# Patient Record
Sex: FEMALE | Race: WHITE | NOT HISPANIC OR LATINO | URBAN - METROPOLITAN AREA
[De-identification: names, ages, dates, MRNs, and addresses within clinical notes are randomized per-mention and may not be internally consistent; named-entity substitution may affect disease eponyms.]

---

## 2017-01-18 ENCOUNTER — OUTPATIENT (OUTPATIENT)
Dept: OUTPATIENT SERVICES | Facility: HOSPITAL | Age: 82
LOS: 1 days | Discharge: HOME | End: 2017-01-18

## 2017-01-18 ENCOUNTER — APPOINTMENT (OUTPATIENT)
Dept: HEMATOLOGY ONCOLOGY | Facility: CLINIC | Age: 82
End: 2017-01-18

## 2017-01-18 VITALS
SYSTOLIC BLOOD PRESSURE: 149 MMHG | TEMPERATURE: 99.2 F | HEIGHT: 60 IN | BODY MASS INDEX: 23.95 KG/M2 | HEART RATE: 96 BPM | DIASTOLIC BLOOD PRESSURE: 89 MMHG | RESPIRATION RATE: 14 BRPM | WEIGHT: 122 LBS

## 2017-06-27 DIAGNOSIS — C50.411 MALIGNANT NEOPLASM OF UPPER-OUTER QUADRANT OF RIGHT FEMALE BREAST: ICD-10-CM

## 2017-07-21 ENCOUNTER — OUTPATIENT (OUTPATIENT)
Dept: OUTPATIENT SERVICES | Facility: HOSPITAL | Age: 82
LOS: 1 days | Discharge: HOME | End: 2017-07-21

## 2017-07-21 DIAGNOSIS — R92.8 OTHER ABNORMAL AND INCONCLUSIVE FINDINGS ON DIAGNOSTIC IMAGING OF BREAST: ICD-10-CM

## 2017-07-26 ENCOUNTER — APPOINTMENT (OUTPATIENT)
Dept: HEMATOLOGY ONCOLOGY | Facility: CLINIC | Age: 82
End: 2017-07-26

## 2017-07-27 ENCOUNTER — APPOINTMENT (OUTPATIENT)
Dept: BREAST CENTER | Facility: CLINIC | Age: 82
End: 2017-07-27
Payer: COMMERCIAL

## 2017-07-27 PROCEDURE — 99212 OFFICE O/P EST SF 10 MIN: CPT

## 2017-08-18 ENCOUNTER — APPOINTMENT (OUTPATIENT)
Dept: HEMATOLOGY ONCOLOGY | Facility: CLINIC | Age: 82
End: 2017-08-18

## 2017-08-18 ENCOUNTER — OUTPATIENT (OUTPATIENT)
Dept: OUTPATIENT SERVICES | Facility: HOSPITAL | Age: 82
LOS: 1 days | Discharge: HOME | End: 2017-08-18

## 2017-08-18 VITALS
BODY MASS INDEX: 23.56 KG/M2 | HEIGHT: 60 IN | SYSTOLIC BLOOD PRESSURE: 139 MMHG | RESPIRATION RATE: 14 BRPM | TEMPERATURE: 97.2 F | HEART RATE: 76 BPM | WEIGHT: 120 LBS | DIASTOLIC BLOOD PRESSURE: 79 MMHG

## 2017-08-21 DIAGNOSIS — C50.411 MALIGNANT NEOPLASM OF UPPER-OUTER QUADRANT OF RIGHT FEMALE BREAST: ICD-10-CM

## 2018-01-22 ENCOUNTER — OUTPATIENT (OUTPATIENT)
Dept: OUTPATIENT SERVICES | Facility: HOSPITAL | Age: 83
LOS: 1 days | Discharge: HOME | End: 2018-01-22

## 2018-01-22 DIAGNOSIS — R92.8 OTHER ABNORMAL AND INCONCLUSIVE FINDINGS ON DIAGNOSTIC IMAGING OF BREAST: ICD-10-CM

## 2018-02-01 ENCOUNTER — APPOINTMENT (OUTPATIENT)
Dept: BREAST CENTER | Facility: CLINIC | Age: 83
End: 2018-02-01
Payer: COMMERCIAL

## 2018-02-01 VITALS
DIASTOLIC BLOOD PRESSURE: 80 MMHG | SYSTOLIC BLOOD PRESSURE: 134 MMHG | OXYGEN SATURATION: 96 % | HEART RATE: 78 BPM | BODY MASS INDEX: 23.16 KG/M2 | WEIGHT: 118 LBS | HEIGHT: 60 IN

## 2018-02-01 PROCEDURE — 99213 OFFICE O/P EST LOW 20 MIN: CPT

## 2018-02-23 ENCOUNTER — OUTPATIENT (OUTPATIENT)
Dept: OUTPATIENT SERVICES | Facility: HOSPITAL | Age: 83
LOS: 1 days | Discharge: HOME | End: 2018-02-23

## 2018-02-23 ENCOUNTER — APPOINTMENT (OUTPATIENT)
Dept: HEMATOLOGY ONCOLOGY | Facility: CLINIC | Age: 83
End: 2018-02-23

## 2018-02-23 VITALS
HEIGHT: 60 IN | HEART RATE: 70 BPM | BODY MASS INDEX: 23.95 KG/M2 | TEMPERATURE: 96.6 F | DIASTOLIC BLOOD PRESSURE: 79 MMHG | WEIGHT: 122 LBS | RESPIRATION RATE: 16 BRPM | SYSTOLIC BLOOD PRESSURE: 146 MMHG

## 2018-02-26 DIAGNOSIS — C50.411 MALIGNANT NEOPLASM OF UPPER-OUTER QUADRANT OF RIGHT FEMALE BREAST: ICD-10-CM

## 2018-07-23 ENCOUNTER — APPOINTMENT (OUTPATIENT)
Dept: HEMATOLOGY ONCOLOGY | Facility: CLINIC | Age: 83
End: 2018-07-23

## 2018-07-23 ENCOUNTER — OUTPATIENT (OUTPATIENT)
Dept: OUTPATIENT SERVICES | Facility: HOSPITAL | Age: 83
LOS: 1 days | Discharge: HOME | End: 2018-07-23

## 2018-07-23 VITALS
SYSTOLIC BLOOD PRESSURE: 180 MMHG | HEIGHT: 60 IN | WEIGHT: 120 LBS | RESPIRATION RATE: 16 BRPM | HEART RATE: 65 BPM | BODY MASS INDEX: 23.56 KG/M2 | DIASTOLIC BLOOD PRESSURE: 80 MMHG

## 2018-07-24 DIAGNOSIS — C50.411 MALIGNANT NEOPLASM OF UPPER-OUTER QUADRANT OF RIGHT FEMALE BREAST: ICD-10-CM

## 2018-08-14 ENCOUNTER — APPOINTMENT (OUTPATIENT)
Dept: BREAST CENTER | Facility: CLINIC | Age: 83
End: 2018-08-14
Payer: COMMERCIAL

## 2018-08-14 VITALS
DIASTOLIC BLOOD PRESSURE: 76 MMHG | HEART RATE: 82 BPM | OXYGEN SATURATION: 96 % | BODY MASS INDEX: 23.56 KG/M2 | SYSTOLIC BLOOD PRESSURE: 132 MMHG | WEIGHT: 120 LBS | HEIGHT: 60 IN

## 2018-08-14 PROCEDURE — 99213 OFFICE O/P EST LOW 20 MIN: CPT

## 2019-01-22 NOTE — PAST MEDICAL HISTORY
[Postmenopausal] : The patient is postmenopausal [Menarche Age ____] : age at menarche was [unfilled] [Total Preg ___] : G[unfilled] [Live Births ___] : P[unfilled]  [Age At Live Birth ___] : Age at live birth: [unfilled]

## 2019-01-24 ENCOUNTER — FORM ENCOUNTER (OUTPATIENT)
Age: 84
End: 2019-01-24

## 2019-01-25 ENCOUNTER — OUTPATIENT (OUTPATIENT)
Dept: OUTPATIENT SERVICES | Facility: HOSPITAL | Age: 84
LOS: 1 days | Discharge: HOME | End: 2019-01-25

## 2019-01-25 DIAGNOSIS — Z85.3 PERSONAL HISTORY OF MALIGNANT NEOPLASM OF BREAST: ICD-10-CM

## 2019-01-29 ENCOUNTER — APPOINTMENT (OUTPATIENT)
Dept: BREAST CENTER | Facility: CLINIC | Age: 84
End: 2019-01-29
Payer: COMMERCIAL

## 2019-01-29 VITALS
HEIGHT: 60 IN | SYSTOLIC BLOOD PRESSURE: 138 MMHG | BODY MASS INDEX: 23.16 KG/M2 | HEART RATE: 62 BPM | WEIGHT: 118 LBS | DIASTOLIC BLOOD PRESSURE: 78 MMHG

## 2019-01-29 PROCEDURE — 99212 OFFICE O/P EST SF 10 MIN: CPT

## 2019-01-29 NOTE — HISTORY OF PRESENT ILLNESS
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  Foci of DCIS intermed nuclear grade, cribiform and micropapillary type.\par No RT - Dr. Nagel; No Tamoxifen, No med onc, deferred by pt.  FHx breast cancer - sister at 74.  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  ER/NM (+), HEr2 (-).\par s/p Right NLOC 02/12/16 - 1.25 mm mod diff invasive solid papillary carcinoma, non ext DCIS, low grade; (-) margins.  \par No LVI or perineural invasion.  Papilloma, ALH. \par s/p GUME Insertion 3/10/16; completed PBI on 3/18/16.  on AI by Dr. Hong. - *unable to tolerate endocrine tx.

## 2019-01-29 NOTE — DATA REVIEWED
[FreeTextEntry1] : B/L Dx Mammo - 01/25/19:\par Breast composition:There are scattered areas of fibroglandular density. \par \par The patient is status post a right lumpectomy with stable architectural \par distortion most consistent with postsurgical change. No suspicious masses \par or abnormal clusters of microcalcifications are seen within either breast. \par \par Impression: Status post a right lumpectomy with stable architectural \par distortion most consistent with postsurgical change. \par \par No evidence of malignancy. \par \par Recommendation: Unless otherwise indicated by clinical findings, annual \par screening mammography recommended. \par \par BI-RADS Category 2: Benign

## 2019-01-29 NOTE — ASSESSMENT
[FreeTextEntry1] : Adenike is s/p left breast lumpectomy x2 for breast cancer (2011 and 2017).  She has a benign CBE. There are no palpable findings, nipple discharge or inversion. I reviewed her recent imaging which appears benign.\par \par She will f/up in 6 months for a CBE.  \par \par I spent a total of 10 minutes of face to face time with this patient, greater than 50% of which was spent in counseling and/or coordination of care.  All of her questions were appropriately answered.\par

## 2019-01-29 NOTE — REVIEW OF SYSTEMS
[Negative] : Constitutional [Breast Pain] : no breast pain [Breast Lump] : no breast lump [Nipple Discharge] : no nipple discharge [Nipple Inverted] : no inversion of the nipple

## 2019-03-12 ENCOUNTER — EMERGENCY (EMERGENCY)
Facility: HOSPITAL | Age: 84
LOS: 0 days | Discharge: HOME | End: 2019-03-12
Attending: EMERGENCY MEDICINE | Admitting: EMERGENCY MEDICINE

## 2019-03-12 VITALS
OXYGEN SATURATION: 97 % | DIASTOLIC BLOOD PRESSURE: 54 MMHG | RESPIRATION RATE: 18 BRPM | TEMPERATURE: 98 F | SYSTOLIC BLOOD PRESSURE: 114 MMHG | HEART RATE: 80 BPM

## 2019-03-12 VITALS
TEMPERATURE: 97 F | RESPIRATION RATE: 17 BRPM | OXYGEN SATURATION: 96 % | HEART RATE: 73 BPM | DIASTOLIC BLOOD PRESSURE: 63 MMHG | SYSTOLIC BLOOD PRESSURE: 136 MMHG

## 2019-03-12 DIAGNOSIS — S82.209A UNSPECIFIED FRACTURE OF SHAFT OF UNSPECIFIED TIBIA, INITIAL ENCOUNTER FOR CLOSED FRACTURE: ICD-10-CM

## 2019-03-12 DIAGNOSIS — E78.5 HYPERLIPIDEMIA, UNSPECIFIED: ICD-10-CM

## 2019-03-12 DIAGNOSIS — R91.8 OTHER NONSPECIFIC ABNORMAL FINDING OF LUNG FIELD: ICD-10-CM
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
## 2019-03-12 DIAGNOSIS — S82.409A UNSPECIFIED FRACTURE OF SHAFT OF UNSPECIFIED FIBULA, INITIAL ENCOUNTER FOR CLOSED FRACTURE: ICD-10-CM

## 2019-03-12 DIAGNOSIS — R26.2 DIFFICULTY IN WALKING, NOT ELSEWHERE CLASSIFIED: ICD-10-CM
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
## 2019-03-12 DIAGNOSIS — I10 ESSENTIAL (PRIMARY) HYPERTENSION: ICD-10-CM

## 2019-03-12 LAB
ALBUMIN SERPL ELPH-MCNC: 3.9 G/DL — SIGNIFICANT CHANGE UP (ref 3.5–5.2)
ALP SERPL-CCNC: 43 U/L — SIGNIFICANT CHANGE UP (ref 30–115)
ALT FLD-CCNC: 14 U/L — SIGNIFICANT CHANGE UP (ref 0–41)
ANION GAP SERPL CALC-SCNC: 10 MMOL/L — SIGNIFICANT CHANGE UP (ref 7–14)
AST SERPL-CCNC: 24 U/L — SIGNIFICANT CHANGE UP (ref 0–41)
BASOPHILS # BLD AUTO: 0 K/UL — SIGNIFICANT CHANGE UP (ref 0–0.2)
BASOPHILS NFR BLD AUTO: 0 % — SIGNIFICANT CHANGE UP (ref 0–1)
BILIRUB SERPL-MCNC: 1 MG/DL — SIGNIFICANT CHANGE UP (ref 0.2–1.2)
BUN SERPL-MCNC: 16 MG/DL — SIGNIFICANT CHANGE UP (ref 10–20)
CALCIUM SERPL-MCNC: 8.3 MG/DL — LOW (ref 8.5–10.1)
CHLORIDE SERPL-SCNC: 97 MMOL/L — LOW (ref 98–110)
CO2 SERPL-SCNC: 27 MMOL/L — SIGNIFICANT CHANGE UP (ref 17–32)
CREAT SERPL-MCNC: 0.6 MG/DL — LOW (ref 0.7–1.5)
EOSINOPHIL # BLD AUTO: 0 K/UL — SIGNIFICANT CHANGE UP (ref 0–0.7)
EOSINOPHIL NFR BLD AUTO: 0 % — SIGNIFICANT CHANGE UP (ref 0–8)
GLUCOSE SERPL-MCNC: 118 MG/DL — HIGH (ref 70–99)
HCT VFR BLD CALC: 41.9 % — SIGNIFICANT CHANGE UP (ref 37–47)
HGB BLD-MCNC: 14.1 G/DL — SIGNIFICANT CHANGE UP (ref 12–16)
IMM GRANULOCYTES NFR BLD AUTO: 0.1 % — SIGNIFICANT CHANGE UP (ref 0.1–0.3)
LACTATE SERPL-SCNC: 1.6 MMOL/L — SIGNIFICANT CHANGE UP (ref 0.5–2.2)
LYMPHOCYTES # BLD AUTO: 0.58 K/UL — LOW (ref 1.2–3.4)
LYMPHOCYTES # BLD AUTO: 7.5 % — LOW (ref 20.5–51.1)
MCHC RBC-ENTMCNC: 30.1 PG — SIGNIFICANT CHANGE UP (ref 27–31)
MCHC RBC-ENTMCNC: 33.7 G/DL — SIGNIFICANT CHANGE UP (ref 32–37)
MCV RBC AUTO: 89.3 FL — SIGNIFICANT CHANGE UP (ref 81–99)
MONOCYTES # BLD AUTO: 0.38 K/UL — SIGNIFICANT CHANGE UP (ref 0.1–0.6)
MONOCYTES NFR BLD AUTO: 4.9 % — SIGNIFICANT CHANGE UP (ref 1.7–9.3)
NEUTROPHILS # BLD AUTO: 6.75 K/UL — HIGH (ref 1.4–6.5)
NEUTROPHILS NFR BLD AUTO: 87.5 % — HIGH (ref 42.2–75.2)
NRBC # BLD: 0 /100 WBCS — SIGNIFICANT CHANGE UP (ref 0–0)
PLATELET # BLD AUTO: 163 K/UL — SIGNIFICANT CHANGE UP (ref 130–400)
POTASSIUM SERPL-MCNC: 3.2 MMOL/L — LOW (ref 3.5–5)
POTASSIUM SERPL-SCNC: 3.2 MMOL/L — LOW (ref 3.5–5)
PROT SERPL-MCNC: 6.3 G/DL — SIGNIFICANT CHANGE UP (ref 6–8)
RBC # BLD: 4.69 M/UL — SIGNIFICANT CHANGE UP (ref 4.2–5.4)
RBC # FLD: 13 % — SIGNIFICANT CHANGE UP (ref 11.5–14.5)
SODIUM SERPL-SCNC: 134 MMOL/L — LOW (ref 135–146)
WBC # BLD: 7.72 K/UL — SIGNIFICANT CHANGE UP (ref 4.8–10.8)
WBC # FLD AUTO: 7.72 K/UL — SIGNIFICANT CHANGE UP (ref 4.8–10.8)

## 2019-03-12 RX ORDER — MORPHINE SULFATE 50 MG/1
4 CAPSULE, EXTENDED RELEASE ORAL ONCE
Qty: 0 | Refills: 0 | Status: DISCONTINUED | OUTPATIENT
Start: 2019-03-12 | End: 2019-03-12

## 2019-03-12 RX ORDER — SODIUM CHLORIDE 9 MG/ML
1000 INJECTION INTRAMUSCULAR; INTRAVENOUS; SUBCUTANEOUS ONCE
Qty: 0 | Refills: 0 | Status: COMPLETED | OUTPATIENT
Start: 2019-03-12 | End: 2019-03-12

## 2019-03-12 RX ADMIN — MORPHINE SULFATE 4 MILLIGRAM(S): 50 CAPSULE, EXTENDED RELEASE ORAL at 12:52

## 2019-03-12 RX ADMIN — MORPHINE SULFATE 4 MILLIGRAM(S): 50 CAPSULE, EXTENDED RELEASE ORAL at 13:50

## 2019-03-12 RX ADMIN — SODIUM CHLORIDE 2000 MILLILITER(S): 9 INJECTION INTRAMUSCULAR; INTRAVENOUS; SUBCUTANEOUS at 13:50

## 2019-03-12 NOTE — ED PROVIDER NOTE - OBJECTIVE STATEMENT
83 yo F now with acute gastrointestinal upset, vomiting and diarrhea, went to bathroom yesterday and on the way, injured her ankle, unable to ambulate, has a sharp pain to distal R leg, worse with movt, since yesterday.  no vomiting today. no head inj.

## 2019-03-12 NOTE — ED ADULT NURSE NOTE - NS PRO PASSIVE SMOKE EXP
H&P Update:  Joan Tse was seen and examined. History and physical has been reviewed. The patient has been examined.  There have been no significant clinical changes since the completion of the originally dated History and Physical.    Signed By: Sherri Platt MD     September 11, 2017 9:16 AM Unknown

## 2019-03-12 NOTE — ED PROVIDER NOTE - CARE PROVIDER_API CALL
Joey Gonzalez)  Orthopaedic Surgery  [address] [place] [address]  Phone: [phone]  Fax: [phone]  Follow Up Time: 4-6 Days

## 2019-03-12 NOTE — ED PROVIDER NOTE - NS ED ROS FT
Review of Systems    Constitutional: (-) fever  Cardiovascular: (-) chest pain, (-) syncope  Respiratory: (-) cough, (-) shortness of breath  Gastrointestinal: (+) vomiting, (+) diarrhea, (-) abdominal pain  Musculoskeletal: (-) neck pain, (-) back pain, (-) joint pain  Integumentary: (-) rash, (-) edema  Neurological: (-) headache, (-) altered mental status    Except as documented in the HPI, all other systems are negative.

## 2019-03-12 NOTE — ED ADULT NURSE NOTE - OBJECTIVE STATEMENT
Pt with hx of HTN, high cholesterol, GERD and osteoporosis presents s/p fall yesterday. Pt has been having flu like symptoms and last night while in the bathroom pt tripped and fell injuring right ankle. No head trauma, no LOC, no blood thinners. Pt c/o pain and swelling to right ankle/shin with some redness and an abrasion noted.

## 2019-03-12 NOTE — ED PROVIDER NOTE - PHYSICAL EXAMINATION
VITAL SIGNS: I have reviewed nursing notes and confirm.  CONSTITUTIONAL: non-toxic, well appearing, elderly female, + airway intact, GCS 15  SKIN: + abrasion to distal R leg, with swelling, no crepitus  EYES: PERRL, EOMI,  ENT: MMM, tongue midline  NECK: Supple; no cervical-thoracic-lumbar spine tenderness  CARD: RRR, equal radial pulses bilaterally 2+  RESP: CTAB, no respiratory distress, no crepitus over chest wall  ABD: Soft, non-tender, non-distended  PELVIS: stable  EXT: Normal ROM x3, decreased ROM to R ankel. + bony tenderness distal R leg, laterally, + DP bilat. 2+, equal strength bilaterally  NEURO: Alert, oriented. CN2-12 intact, equal strength bilaterally, nl gait.  PSYCH: Cooperative, appropriate.

## 2019-03-12 NOTE — ED ADULT TRIAGE NOTE - CHIEF COMPLAINT QUOTE
Pt s/p fall yesterday, pt reports she has the flu and was weak, c/o right ankle pain, pt reports she has the flu

## 2019-03-12 NOTE — ED PROVIDER NOTE - CARE PLAN
Principal Discharge DX:	Tibial fracture  Secondary Diagnosis:	Fibula fracture  Secondary Diagnosis:	Opacity of lung on imaging study

## 2019-03-12 NOTE — ED ADULT NURSE NOTE - NSIMPLEMENTINTERV_GEN_ALL_ED
Implemented All Fall with Harm Risk Interventions:  [place] to call system. Call bell, personal items and telephone within reach. Instruct patient to call for assistance. Room bathroom lighting operational. Non-slip footwear when patient is off stretcher. Physically safe environment: no spills, clutter or unnecessary equipment. Stretcher in lowest position, wheels locked, appropriate side rails in place. Provide visual cue, wrist band, yellow gown, etc. Monitor gait and stability. Monitor for mental status changes and reorient to person, place, and time. Review medications for side effects contributing to fall risk. Reinforce activity limits and safety measures with patient and family. Provide visual clues: red socks.

## 2019-03-12 NOTE — ED PROVIDER NOTE - PROGRESS NOTE DETAILS
ortho consulted seen by ortho. splint applied by ortho. stable for dc. repeat xray after splint. additionally, recommending abx for possible cellulititis. no leukocytosis.  no cough. cxr with bilat opaciites. family aware. will initiate doxy to cover for both.  f/u ortho outpt. son will take pt home. walker to be obtained. f/u dr ng. repeat xrays done. hypokalemia - instructed regarding dietary intake, eg. bananas.

## 2019-03-12 NOTE — ED PROVIDER NOTE - CLINICAL SUMMARY MEDICAL DECISION MAKING FREE TEXT BOX
tib/fib, acute, seen by ortho. splint applied by ortho. stable for dc. repeat xray after splint. additionally, recommending abx for possible cellulititis. no leukocytosis.  no cough. cxr with bilat opaciites. family aware. will initiate doxy to cover for both.  f/u ortho outpt.

## 2019-07-22 ENCOUNTER — OUTPATIENT (OUTPATIENT)
Dept: OUTPATIENT SERVICES | Facility: HOSPITAL | Age: 84
LOS: 1 days | Discharge: HOME | End: 2019-07-22

## 2019-07-22 ENCOUNTER — APPOINTMENT (OUTPATIENT)
Dept: HEMATOLOGY ONCOLOGY | Facility: CLINIC | Age: 84
End: 2019-07-22
Payer: COMMERCIAL

## 2019-07-22 VITALS
HEIGHT: 57.5 IN | HEART RATE: 75 BPM | WEIGHT: 117 LBS | DIASTOLIC BLOOD PRESSURE: 80 MMHG | TEMPERATURE: 96.8 F | BODY MASS INDEX: 24.9 KG/M2 | SYSTOLIC BLOOD PRESSURE: 166 MMHG | RESPIRATION RATE: 16 BRPM

## 2019-07-22 PROBLEM — E78.5 HYPERLIPIDEMIA, UNSPECIFIED: Chronic | Status: ACTIVE | Noted: 2019-03-12

## 2019-07-22 PROBLEM — M81.0 AGE-RELATED OSTEOPOROSIS WITHOUT CURRENT PATHOLOGICAL FRACTURE: Chronic | Status: ACTIVE | Noted: 2019-03-12

## 2019-07-22 PROBLEM — I10 ESSENTIAL (PRIMARY) HYPERTENSION: Chronic | Status: ACTIVE | Noted: 2019-03-12

## 2019-07-22 PROBLEM — K21.9 GASTRO-ESOPHAGEAL REFLUX DISEASE WITHOUT ESOPHAGITIS: Chronic | Status: ACTIVE | Noted: 2019-03-12

## 2019-07-22 PROCEDURE — 99213 OFFICE O/P EST LOW 20 MIN: CPT

## 2019-07-24 NOTE — CONSULT LETTER
[Courtesy Letter:] : I had the pleasure of seeing your patient, [unfilled], in my office today. [Dear  ___] : Dear  [unfilled], [Sincerely,] : Sincerely, [Please see my note below.] : Please see my note below. [FreeTextEntry3] : Filipe Hong MD

## 2019-07-24 NOTE — PHYSICAL EXAM
[Restricted in physically strenuous activity but ambulatory and able to carry out work of a light or sedentary nature] : Status 1- Restricted in physically strenuous activity but ambulatory and able to carry out work of a light or sedentary nature, e.g., light house work, office work [Normal] : affect appropriate [de-identified] : The right breast is s/p lumpectomy.  There is no palpable abnormality.  The left breast and left axilla are normal. [de-identified] : Status post right ankle fracture.

## 2019-07-24 NOTE — HISTORY OF PRESENT ILLNESS
[de-identified] :  83-year-old female is here today for a followup visit.  She has a history of a right breast solid papillary carcinoma, status post lumpectomy in 01/2012.  She subsequently developed recurrent solid papillary carcinoma, ER/[place] positive and DCIS,  and had right breast lumpectomy on 02/12/2016.  She received partial breast radiotherapy with GUME catheter.  She took adjuvant endocrine therapy with anastrozole for a few months and developed a lot of body aching. She was given an option to try Exemestane but she could not.  She subsequently stopped medication. She was on Duloxetine for Fibromyalgia and stopped subsequently because her symptom resolved. \par \par On 1/2018, she had b/l diagnostic mammo and there was no suspicious finding.  On 7/21/17, she had right breast dx mammo which showed stable post surgical change.\par \par The patient also has history of osteopenia, and she has been taking Caltrate 600 plus D and Fosamax.  \par \par She follows with Dr. Isaac Lo at [facility] for osteoporosis. She stopped Fosamax and switched to Prolia. \par \par Today, she reports feeling well. She does not have nay breast related symptoms. \par  [de-identified] : She can not tolerate AI due to muscular skeletal side effects. She sees an endocrinologist for osteopenia and osteoporosis. She is getting Prolia injection every 6 months.\par Today, she reports feeling well. Sometime, she has pain in her right breast, lumpectomy site, which resolves on its own. \par \par 7/22/19:\par The patient is here for f/u visit. She feels well and has no breast related symptoms. In 1/2019, she had b/l mammo. There was no suspicious finding. She can not tolerate AI due to muscular skeletal side effects. She sees an endocrinologist for osteopenia and osteoporosis. She is getting Prolia injection every 6 months. She tripled down and had right ankle fracture a few months ago.

## 2019-07-24 NOTE — ASSESSMENT
[FreeTextEntry1] : 1. Recurrent right breast solid papillary carcinoma, ER/ID positive and ductal carcinoma in situ, status post lumpectomy, partial breast radiotherapy, unable to tolerate endocrine therapy.  There is no evidence of recurrent disease.\par 2. Osteopenia/Osteoporosis.\par \par RECOMMENDATION:  \par 1. Continue observation and annual screening mammogram.\par 2. She will continue Prolia, calcium and vitamin D supplement for osteopenia/osteoporosis.\par 3. She will come back for followup visit in 12 months.\par 4. followup with PCP for healthy maintenance.\par

## 2019-07-31 DIAGNOSIS — C50.411 MALIGNANT NEOPLASM OF UPPER-OUTER QUADRANT OF RIGHT FEMALE BREAST: ICD-10-CM

## 2019-07-31 DIAGNOSIS — M81.0 AGE-RELATED OSTEOPOROSIS WITHOUT CURRENT PATHOLOGICAL FRACTURE: ICD-10-CM

## 2019-08-15 ENCOUNTER — APPOINTMENT (OUTPATIENT)
Dept: BREAST CENTER | Facility: CLINIC | Age: 84
End: 2019-08-15
Payer: COMMERCIAL

## 2019-08-15 VITALS
SYSTOLIC BLOOD PRESSURE: 122 MMHG | TEMPERATURE: 97.8 F | BODY MASS INDEX: 25.24 KG/M2 | WEIGHT: 117 LBS | HEIGHT: 57 IN | DIASTOLIC BLOOD PRESSURE: 80 MMHG

## 2019-08-15 PROCEDURE — 99213 OFFICE O/P EST LOW 20 MIN: CPT

## 2019-08-15 NOTE — HISTORY OF PRESENT ILLNESS
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  Foci of DCIS intermed nuclear grade, cribiform and micropapillary type.\par No RT - Dr. Nagel; No Tamoxifen, No med onc, deferred by pt.  FHx breast cancer - sister at 74.  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  ER/FL (+), HEr2 (-).\par s/p Right NLOC 02/12/16 - 1.25 mm mod diff invasive solid papillary carcinoma, non ext DCIS, low grade; (-) margins.  \par No LVI or perineural invasion.  Papilloma, ALH. \par s/p GUME Insertion 3/10/16; completed PBI on 3/18/16.  on AI by Dr. Hong. - *unable to tolerate endocrine tx.

## 2020-01-19 ENCOUNTER — FORM ENCOUNTER (OUTPATIENT)
Age: 85
End: 2020-01-19

## 2020-01-20 ENCOUNTER — OUTPATIENT (OUTPATIENT)
Dept: OUTPATIENT SERVICES | Facility: HOSPITAL | Age: 85
LOS: 1 days | Discharge: HOME | End: 2020-01-20
Payer: MEDICARE

## 2020-01-20 DIAGNOSIS — Z85.3 PERSONAL HISTORY OF MALIGNANT NEOPLASM OF BREAST: ICD-10-CM

## 2020-01-20 PROCEDURE — G0279: CPT | Mod: 26

## 2020-01-20 PROCEDURE — 77066 DX MAMMO INCL CAD BI: CPT | Mod: 26

## 2020-02-18 ENCOUNTER — APPOINTMENT (OUTPATIENT)
Dept: BREAST CENTER | Facility: CLINIC | Age: 85
End: 2020-02-18
Payer: MEDICARE

## 2020-02-18 VITALS
HEIGHT: 57 IN | SYSTOLIC BLOOD PRESSURE: 130 MMHG | TEMPERATURE: 98.6 F | DIASTOLIC BLOOD PRESSURE: 76 MMHG | WEIGHT: 117 LBS | BODY MASS INDEX: 25.24 KG/M2

## 2020-02-18 PROCEDURE — 99212 OFFICE O/P EST SF 10 MIN: CPT

## 2020-02-18 NOTE — DATA REVIEWED
[FreeTextEntry1] : EXAM: MG MAMMO DIAG W GARRISON BI# \par PROCEDURE DATE: 01/20/2020 \par INTERPRETATION: HISTORY: \par Bilateral MG MAMMO DIAG W GARRISON BI# was performed. Patient is 85 years old and is seen for diagnostic evaluation. \par The patient has a history of right lumpectomy at age 77. The patient has no family history of breast cancer. \par RISK ASSESSMENT: \par Tyrer-Cuzick Lifetime Risk: 0.1 \par CLINICAL BREAST EXAM: \par The patient reports her last clinical breast exam was performed 5 months ago. \par COMPARISON STUDIES: \par The present examination has been compared to prior imaging studies performed at [facility] on \par 01/04/2017, 07/21/2017, 01/22/2018 and 01/25/2019. \par MAMMOGRAM FINDINGS: \par Mammography was performed including the following views: bilateral craniocaudal with tomosynthesis and bilateral \par mediolateral oblique with tomosynthesis. The examination includes digital synthetic 2D and digital tomosynthesis 3D \par images. Additional imaging analysis was performed using CAD (computer-aided detection) software. \par There are scattered areas of fibroglandular density. \par There is an area of architectural distortion at the site of lumpectomy seen in the right breast. \par No suspicious mass, grouping of calcifications, or other abnormality is identified.\par IMPRESSION: \par There is no mammographic evidence of malignancy. \par RECOMMENDATION: \par Unless otherwise indicated by clinical findings, the patient should resume annual screening in 1 year.\par ASSESSMENT: \par BI-RADS Category 2: Benign \par The patient will be notified of these results by telephone, and will also be mailed a written summary in layman's terms.\par JARROD JUAN M.D., ATTENDING RADIOLOGIST \par This document has been electronically signed. Jan 20 2020 9:29AM

## 2020-02-18 NOTE — ASSESSMENT
[FreeTextEntry1] : 85 year old female who presents today for her clinical breast examination.  She has a history of right breast solid papillary carcinoma on core biopsy in 2011 status post lumpectomy at 12:00 location demonstrating papillary carcinoma in situ.  She did not receive radiation therapy at that time and she deferred medical oncology.  She also has a history of right breast estrogen receptor positive solid papillary carcinoma on core biopsy 1/16/16 status post lumpectomy at the 8:00 position demonstrating moderately differentiated invasive solid papillary carcinoma and non extensive DCIS.  She received radiation via the GUME catheter for five days. She was an an AI briefly but discontinued due to side effects.  Her family history is significant for her sister with breast cancer at 74.  \par \par Bilateral diagnostic mammogram was performed in January.  This demonstrated scattered areas of fibroglandular densities.  There is an area of architectural distortion at the lumpectomy site seen in the right breast.  There are no suspicious masses, groupings of calcifications or other abnormalities identified.\par \par She is here for evaluation of these findings.  At this time, these findings do not present the need for surgical intervention.  She has a benign clinical breast examination and no current complaints related to the breasts. For now, she will need her annual screening mammograms scheduled for one year.  She will return in six months for another post operative examination.  I spent a total of 10 minutes of face to face time with this patient, greater than 50% of which was spent in counseling and/or coordination of care.  All of her questions were appropriately answered.

## 2020-02-18 NOTE — REVIEW OF SYSTEMS
[Fever] : no fever [Chills] : no chills [Breast Lump] : no breast lump [Breast Pain] : no breast pain [Breast Reddening] : no reddening of the breast [Breast Swelling] : no breast swelling [Breast Warmth] : no breast warmth [Enlargement] : no breast enlargement [Breast Itching] : no breast itching [Decreasing In Size] : breast size not decreasing [Dimpling Of Skin] : no dimpling of breast skin ['Orange Peel' Appearance] : no 'orange peel' appearance of breast skin [Nipple Inverted] : no inversion of the nipple [Nipple Discharge] : no nipple discharge

## 2020-02-18 NOTE — PHYSICAL EXAM
[Normocephalic] : normocephalic [Atraumatic] : atraumatic [Supple] : supple [No Supraclavicular Adenopathy] : no supraclavicular adenopathy [No Cervical Adenopathy] : no cervical adenopathy [Examined in the supine and seated position] : examined in the supine and seated position [No Thyromegaly] : no thyromegaly [Symmetrical] : symmetrical [No dominant masses] : no dominant masses in right breast  [No dominant masses] : no dominant masses left breast [No Nipple Retraction] : no left nipple retraction [No Nipple Discharge] : no right nipple discharge [Breast Mass Right Breast ___cm] : no masses [Breast Mass Left Breast ___cm] : no masses [No Axillary Lymphadenopathy] : no right axillary lymphadenopathy [No Edema] : no edema [No Swelling] : no swelling [Full ROM] : full range of motion [No Rashes] : no rashes [No Ulceration] : no ulceration [Breast Nipple Inversion] : nipples not inverted [Breast Nipple Retraction] : nipples not retracted [Breast Nipple Flattening] : nipples not flattened [Breast Nipple Fissures] : nipples not fissured [Breast Abnormal Lactation (Galactorrhea)] : no galactorrhea [Breast Abnormal Secretion Bloody Fluid] : no bloody discharge [Breast Abnormal Secretion Serous Fluid] : no serous discharge [Breast Abnormal Secretion Opalescent Fluid] : no milky discharge

## 2020-02-18 NOTE — HISTORY OF PRESENT ILLNESS
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  Foci of DCIS intermediate nuclear grade, cribiform and micropapillary type.\par No RT - Dr. Nagel; No Tamoxifen, No med oncology, deferred by patient.  \par \par Her family history is significant for her sister with breast cancer at 74.  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  \par Estrogen receptor positive\par Progesterone receptor positive\par HER2 negative\par \par Status post Right NLOC 02/12/16 demonstrating 1.25 mm mod differentiated invasive solid papillary carcinoma, non extensive DCIS, low grade; with negative margins.  No LVI or perineural invasion, papilloma, ALH. \par \par Status post GUME Insertion 3/10/16; completed PBI on 3/18/16.  \par Was on AI by Dr. Hong; discontinued due to side effects.

## 2020-02-24 ENCOUNTER — APPOINTMENT (OUTPATIENT)
Dept: RADIATION ONCOLOGY | Facility: HOSPITAL | Age: 85
End: 2020-02-24
Payer: MEDICARE

## 2020-02-24 VITALS
HEART RATE: 72 BPM | WEIGHT: 119 LBS | RESPIRATION RATE: 16 BRPM | BODY MASS INDEX: 25.75 KG/M2 | TEMPERATURE: 96.6 F | SYSTOLIC BLOOD PRESSURE: 160 MMHG | DIASTOLIC BLOOD PRESSURE: 72 MMHG

## 2020-02-24 DIAGNOSIS — C80.1 MALIGNANT (PRIMARY) NEOPLASM, UNSPECIFIED: ICD-10-CM

## 2020-02-24 PROCEDURE — 99213 OFFICE O/P EST LOW 20 MIN: CPT

## 2020-02-24 NOTE — DISEASE MANAGEMENT
[Pathological] : TNM Stage: p [0] : 0 [MTNM] : 0 [NTNM] : 0 [TTNM] : cis [de-identified] : Right breast

## 2020-02-24 NOTE — HISTORY OF PRESENT ILLNESS
[FreeTextEntry1] : Patient here for follow up, no complaints offered. Seen by Dr. Hong 7/22/19, and Dr. Castro 2/18/2020, Last mammo 1/20/2020. Patient states she feel March 2019 and fractured right leg. Followed by orthopedic at [facility].   \par \par Treatment was in March 2016.

## 2020-02-24 NOTE — PHYSICAL EXAM
[Normal] : normoactive bowel sounds, soft and nontender, no hepatosplenomegaly or masses appreciated [de-identified] : The right, treated breast is overall soft, although firmer at the treatment site in the LOQ.  There are no suspicious findings.  the left breast is negative.

## 2020-08-24 ENCOUNTER — OUTPATIENT (OUTPATIENT)
Dept: OUTPATIENT SERVICES | Facility: HOSPITAL | Age: 85
LOS: 1 days | Discharge: HOME | End: 2020-08-24

## 2020-08-24 ENCOUNTER — APPOINTMENT (OUTPATIENT)
Dept: HEMATOLOGY ONCOLOGY | Facility: CLINIC | Age: 85
End: 2020-08-24
Payer: MEDICARE

## 2020-08-24 VITALS
WEIGHT: 120 LBS | HEIGHT: 55 IN | SYSTOLIC BLOOD PRESSURE: 178 MMHG | BODY MASS INDEX: 27.77 KG/M2 | DIASTOLIC BLOOD PRESSURE: 98 MMHG | TEMPERATURE: 98.3 F | HEART RATE: 71 BPM

## 2020-08-24 PROCEDURE — 99213 OFFICE O/P EST LOW 20 MIN: CPT

## 2020-08-24 NOTE — CONSULT LETTER
[Dear  ___] : Dear  [unfilled], [Sincerely,] : Sincerely, [Courtesy Letter:] : I had the pleasure of seeing your patient, [unfilled], in my office today. [Please see my note below.] : Please see my note below. [FreeTextEntry3] : Filipe Hong MD

## 2020-08-24 NOTE — ASSESSMENT
[FreeTextEntry1] : 1. Recurrent right breast solid papillary carcinoma, ER/FL positive and ductal carcinoma in situ, status post lumpectomy, partial breast radiotherapy, unable to tolerate endocrine therapy.  There is no evidence of recurrent disease.\par 2. Osteopenia/Osteoporosis.\par \par RECOMMENDATION:  \par 1. Continue observation and annual screening mammogram.\par 2. Followup with endocrinologist at [facility] for osteopenia/osteoporosis. Continue calcium and vitamin D supplement.\par 3. She will come back for followup visit in 12 months.\par 4. followup with PCP for healthy maintenance.\par

## 2020-08-24 NOTE — HISTORY OF PRESENT ILLNESS
[de-identified] :  83-year-old female is here today for a followup visit.  She has a history of a right breast solid papillary carcinoma, status post lumpectomy in 01/2012.  She subsequently developed recurrent solid papillary carcinoma, ER/SD positive and DCIS,  and had right breast lumpectomy on 02/12/2016.  She received partial breast radiotherapy with GUME catheter.  She took adjuvant endocrine therapy with anastrozole for a few months and developed a lot of body aching. She was given an option to try Exemestane but she could not.  She subsequently stopped medication. She was on Duloxetine for Fibromyalgia and stopped subsequently because her symptom resolved. \par \par On 1/2018, she had b/l diagnostic mammo and there was no suspicious finding.  On 7/21/17, she had right breast dx mammo which showed stable post surgical change.\par \par The patient also has history of osteopenia, and she has been taking Caltrate 600 plus D and Fosamax.  \par \par She follows with Dr. Isaac Lo at [facility] for osteoporosis. She stopped Fosamax and switched to Prolia. \par \par Today, she reports feeling well. She does not have nay breast related symptoms. \par  [de-identified] : She can not tolerate AI due to muscular skeletal side effects. She sees an endocrinologist for osteopenia and osteoporosis. She is getting Prolia injection every 6 months.\par Today, she reports feeling well. Sometime, she has pain in her right breast, lumpectomy site, which resolves on its own. \par \par 7/22/19:\par The patient is here for f/u visit. She feels well and has no breast related symptoms. In 1/2019, she had b/l mammo. There was no suspicious finding. She can not tolerate AI due to muscular skeletal side effects. She sees an endocrinologist for osteopenia and osteoporosis. She is getting Prolia injection every 6 months. She tripled down and had right ankle fracture a few months ago.\par \par 8/24/2020:\par The patient is here for f/u visit. She has a history of Recurrent right breast solid papillary carcinoma, ER/[place] positive and ductal carcinoma in situ, status post lumpectomy, partial breast radiotherapy, unable to tolerate endocrine therapy. She had repeat mammo in 1/2020. There was no suspicious finding. She can not tolerate AI due to muscular skeletal side effects. She sees an endocrinologist for osteopenia and osteoporosis. She is getting Prolia injection every 6 months. She does not have new complains.

## 2020-08-24 NOTE — PHYSICAL EXAM
[Restricted in physically strenuous activity but ambulatory and able to carry out work of a light or sedentary nature] : Status 1- Restricted in physically strenuous activity but ambulatory and able to carry out work of a light or sedentary nature, e.g., light house work, office work [de-identified] : The right breast is s/p lumpectomy.  There is no palpable abnormality.  The left breast and left axilla are normal. [Normal] : affect appropriate [de-identified] : Status post right ankle fracture.

## 2020-09-04 DIAGNOSIS — C50.411 MALIGNANT NEOPLASM OF UPPER-OUTER QUADRANT OF RIGHT FEMALE BREAST: ICD-10-CM

## 2020-10-06 ENCOUNTER — APPOINTMENT (OUTPATIENT)
Dept: BREAST CENTER | Facility: CLINIC | Age: 85
End: 2020-10-06
Payer: COMMERCIAL

## 2020-10-06 VITALS
DIASTOLIC BLOOD PRESSURE: 80 MMHG | TEMPERATURE: 97.3 F | WEIGHT: 120 LBS | HEIGHT: 55 IN | BODY MASS INDEX: 27.77 KG/M2 | SYSTOLIC BLOOD PRESSURE: 132 MMHG

## 2020-10-06 PROCEDURE — 99213 OFFICE O/P EST LOW 20 MIN: CPT

## 2020-10-06 NOTE — HISTORY OF PRESENT ILLNESS
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  Foci of DCIS intermediate nuclear grade, cribiform and micropapillary type.\par No RT - Dr. Nagel; No Tamoxifen, No med oncology, deferred by patient.  \par \par Her family history is significant for her sister with breast cancer at 74.  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  \par Estrogen receptor positive\par Progesterone receptor positive\par HER2 negative\par \par Status post Right NLOC 02/12/16 demonstrating 1.25 mm mod differentiated invasive solid papillary carcinoma, non extensive DCIS, low grade; with negative margins.  No LVI or perineural invasion, papilloma, ALH. \par \par Status post GUME Insertion 3/10/16; completed PBI on 3/18/16.  \par Was on AI by Dr. Hong; discontinued due to side effects.  \par \par SARANYA RALPH is a 85 year old female patient who presents today in follow up for history of right breast cancer (2011; 2016).\par Since her last visit, she has no new breast related complaints. \par Imaging was done in January 2020, which revealed no mammographic evidence of malignancy.\par \par She presents today for evaluation.

## 2020-10-06 NOTE — DATA REVIEWED
[FreeTextEntry1] : B/L Dx Mammo - 01/20/2020:\par MAMMOGRAM FINDINGS: \par Mammography was performed including the following views: bilateral craniocaudal with tomosynthesis and bilateral mediolateral oblique with tomosynthesis. The examination includes digital synthetic 2D and digital tomosynthesis 3D images. Additional imaging analysis was performed using CAD (computer-aided detection) software. \par There are scattered areas of fibroglandular density. \par There is an area of architectural distortion at the site of lumpectomy seen in the right breast. \par No suspicious mass, grouping of calcifications, or other abnormality is identified. \par IMPRESSION: \par There is no mammographic evidence of malignancy. \par RECOMMENDATION: \par Unless otherwise indicated by clinical findings, the patient should resume annual screening in 1 year.\par ASSESSMENT: \par BI-RADS Category 2: Benign

## 2020-10-06 NOTE — ASSESSMENT
[FreeTextEntry1] : SARANYA is a kip 85 year old patient who presented today in follow up for a history of right breast cancer (2011; 2016)..  \par She has been doing well with no new breast related complaints. \par Imaging was done in January 2020 which revealed no mammographic evidence of malignancy, as detailed above. \par Physical exam was unrevealing today.\par \par Imaging with a bilateral screening mammogram will be due in January 2021, and that will be scheduled today. \par She will return for follow-up and clinical breast exam with Dr. Castro in January 2021.\par \par I spent a total of 15 minutes of face to face time with this patient, greater than 50% of which was spent in counseling and/or coordination of care.\par All of her questions were appropriately answered.\par She knows to call with any concerns.\par \par \par

## 2020-10-06 NOTE — REVIEW OF SYSTEMS
[Negative] : Constitutional [Breast Pain] : no breast pain [Breast Lump] : no breast lump [Nipple Discharge] : no nipple discharge [Nipple Inverted] : no inversion of the nipple [de-identified] : Pt is tearful talking about the loss of her .

## 2020-10-06 NOTE — PHYSICAL EXAM
[Normocephalic] : normocephalic [Atraumatic] : atraumatic [No Supraclavicular Adenopathy] : no supraclavicular adenopathy [Examined in the supine and seated position] : examined in the supine and seated position [No dominant masses] : no dominant masses in right breast  [No dominant masses] : no dominant masses left breast [No Nipple Discharge] : no left nipple discharge [No Axillary Lymphadenopathy] : no left axillary lymphadenopathy [No Rashes] : no rashes [No Ulceration] : no ulceration [Breast Nipple Inversion] : nipples not inverted [Breast Nipple Retraction] : nipples not retracted [de-identified] : well healed surgical scars.\par palpable scar tissue.

## 2020-11-24 ENCOUNTER — APPOINTMENT (OUTPATIENT)
Dept: BREAST CENTER | Facility: CLINIC | Age: 85
End: 2020-11-24

## 2021-01-22 ENCOUNTER — RESULT REVIEW (OUTPATIENT)
Age: 86
End: 2021-01-22

## 2021-01-22 ENCOUNTER — OUTPATIENT (OUTPATIENT)
Dept: OUTPATIENT SERVICES | Facility: HOSPITAL | Age: 86
LOS: 1 days | Discharge: HOME | End: 2021-01-22
Payer: MEDICARE

## 2021-01-22 DIAGNOSIS — Z12.31 ENCOUNTER FOR SCREENING MAMMOGRAM FOR MALIGNANT NEOPLASM OF BREAST: ICD-10-CM

## 2021-01-22 PROCEDURE — 77063 BREAST TOMOSYNTHESIS BI: CPT | Mod: 26

## 2021-01-22 PROCEDURE — 77067 SCR MAMMO BI INCL CAD: CPT | Mod: 26

## 2021-02-09 ENCOUNTER — APPOINTMENT (OUTPATIENT)
Dept: BREAST CENTER | Facility: CLINIC | Age: 86
End: 2021-02-09
Payer: MEDICARE

## 2021-02-09 PROCEDURE — 99213 OFFICE O/P EST LOW 20 MIN: CPT

## 2021-02-09 NOTE — DATA REVIEWED
[FreeTextEntry1] : B/L Screening Mammo - 01/22/2021:\par MAMMOGRAM FINDINGS:\par Mammography was performed including the following views: bilateral craniocaudal with tomosynthesis, bilateral mediolateral oblique, bilateral mediolateral oblique with tomosynthesis, and bilateral nipple profile.  The examination includes digital synthetic 2D and digital tomosynthesis 3D images. Additional imaging analysis was performed using CAD (computer-aided detection) software.\par \par There are scattered areas of fibroglandular density.\par \par There is an area of architectural distortion at the site of lumpectomy seen in the right breast.\par \par No suspicious mass, grouping of calcifications, or other abnormality is identified.\par \par There has been no significant interval change from prior studies.\par \par IMPRESSION:\par There is no mammographic evidence of malignancy.\par \par RECOMMENDATION:\par Unless otherwise indicated by clinical findings, annual screening mammography recommended.\par \par ASSESSMENT:\par BI-RADS Category 2:  Benign\par

## 2021-02-09 NOTE — ASSESSMENT
[FreeTextEntry1] : SARANYA is a kip 86 year old patient who presented today in follow up for a history of right breast cancer (2011; 2016).  \par She has been doing well with no new breast related complaints. \par Imaging was done recently which revealed no mammographic evidence of malignancy, as detailed above. \par Physical exam was unrevealing today.\par \par Imaging with a bilateral screening mammogram will be due in January 2022, and that will be scheduled today. \par She will return for follow-up and clinical breast exam with Dr. Castro in January 2022.\par \par I spent a total of 20 minutes of face to face time with this patient, greater than 50% of which was spent in counseling and/or coordination of care.\par All of her questions were appropriately answered.\par She knows to call with any concerns.\par \par \par

## 2021-02-09 NOTE — HISTORY OF PRESENT ILLNESS
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  Foci of DCIS intermediate nuclear grade, cribiform and micropapillary type.\par No RT - Dr. Nagel; No Tamoxifen, No med oncology, deferred by patient.  \par \par Her family history is significant for her sister with breast cancer at 74.  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  \par Estrogen receptor positive\par Progesterone receptor positive\par HER2 negative\par \par Status post Right NLOC 02/12/16 demonstrating 1.25 mm mod differentiated invasive solid papillary carcinoma, non extensive DCIS, low grade; with negative margins.  No LVI or perineural invasion, papilloma, ALH. \par \par Status post GUME Insertion 3/10/16; completed PBI on 3/18/16.  \par Was on AI by Dr. Hong; discontinued due to side effects.  \par \par SARANYA RALPH is a 86 year old female patient who presents today in follow up for history of right breast cancer (2011; 2016).\par Since her last visit, she has no new breast related complaints. \par Imaging was done on 01/22/2021, which revealed no mammographic evidence of malignancy.\par \par She presents today for evaluation.

## 2021-02-09 NOTE — PHYSICAL EXAM
[Normocephalic] : normocephalic [Atraumatic] : atraumatic [No Supraclavicular Adenopathy] : no supraclavicular adenopathy [Examined in the supine and seated position] : examined in the supine and seated position [No dominant masses] : no dominant masses in right breast  [No dominant masses] : no dominant masses left breast [No Nipple Discharge] : no left nipple discharge [No Axillary Lymphadenopathy] : no left axillary lymphadenopathy [No Rashes] : no rashes [No Ulceration] : no ulceration [Breast Nipple Inversion] : nipples not inverted [Breast Nipple Retraction] : nipples not retracted [de-identified] : well healed surgical scars.\par palpable scar tissue.

## 2021-02-09 NOTE — REASON FOR VISIT
[Follow-Up: _____] : a [unfilled] follow-up visit [FreeTextEntry1] : h/o right breast cancer (2011; 2016); imaging review.

## 2021-02-18 ENCOUNTER — APPOINTMENT (OUTPATIENT)
Dept: RADIATION ONCOLOGY | Facility: HOSPITAL | Age: 86
End: 2021-02-18

## 2021-03-10 ENCOUNTER — APPOINTMENT (OUTPATIENT)
Dept: RADIATION ONCOLOGY | Facility: HOSPITAL | Age: 86
End: 2021-03-10
Payer: MEDICARE

## 2021-03-10 VITALS
OXYGEN SATURATION: 97 % | SYSTOLIC BLOOD PRESSURE: 148 MMHG | HEART RATE: 80 BPM | RESPIRATION RATE: 16 BRPM | TEMPERATURE: 98.1 F | WEIGHT: 116 LBS | DIASTOLIC BLOOD PRESSURE: 68 MMHG | BODY MASS INDEX: 33.97 KG/M2

## 2021-03-10 PROCEDURE — 99212 OFFICE O/P EST SF 10 MIN: CPT

## 2021-03-10 RX ORDER — ROSUVASTATIN CALCIUM 5 MG/1
5 TABLET, FILM COATED ORAL
Refills: 0 | Status: ACTIVE | COMMUNITY

## 2021-03-10 RX ORDER — AMLODIPINE BESYLATE 5 MG/1
5 TABLET ORAL
Refills: 0 | Status: ACTIVE | COMMUNITY

## 2021-03-10 RX ORDER — LORAZEPAM 0.5 MG/1
0.5 TABLET ORAL
Refills: 0 | Status: ACTIVE | COMMUNITY

## 2021-03-10 RX ORDER — TELMISARTAN 80 MG/1
80 TABLET ORAL
Refills: 0 | Status: ACTIVE | COMMUNITY

## 2021-03-10 RX ORDER — OMEPRAZOLE 20 MG/1
20 TABLET, DELAYED RELEASE ORAL
Refills: 0 | Status: ACTIVE | COMMUNITY

## 2021-03-10 RX ORDER — CHLORTHALIDONE 25 MG/1
25 TABLET ORAL
Refills: 0 | Status: ACTIVE | COMMUNITY

## 2021-03-10 RX ORDER — DENOSUMAB 60 MG/ML
INJECTION SUBCUTANEOUS
Refills: 0 | Status: ACTIVE | COMMUNITY

## 2021-04-27 NOTE — PHYSICAL EXAM
[Normal] : normoactive bowel sounds, soft and nontender, no hepatosplenomegaly or masses appreciated [de-identified] : The right, treated breast is overall soft, although firmer at the treatment site in the LOQ.  There are no suspicious findings.  the left breast is negative.

## 2021-04-27 NOTE — HISTORY OF PRESENT ILLNESS
[FreeTextEntry1] : 3/10/2021 Follow up: Pt returns for 5 year follow up. Denies pain or discomfort at treatment site. Followed up with Dr. Hong 8/24/20 and Dr. Castro 2/9/2021. Most recent b/l mammogram 1/22/21 showed no mammographic evidence of malignancy.\par \par 2/24/2021Patient here for follow up, no complaints offered. Seen by Dr. Hong 7/22/19, and Dr. Castro 2/18/2020, Last mammo 1/20/2020. Patient states she feel March 2019 and fractured right leg. Followed by orthopedic at [facility].   \par Treatment was in March 2016.

## 2021-04-27 NOTE — DISEASE MANAGEMENT
[Pathological] : TNM Stage: p [0] : 0 [TTNM] : cis [NTNM] : 0 [MTNM] : 0 [de-identified] : Right breast- 5 year follow up

## 2021-08-30 ENCOUNTER — APPOINTMENT (OUTPATIENT)
Dept: HEMATOLOGY ONCOLOGY | Facility: CLINIC | Age: 86
End: 2021-08-30
Payer: MEDICARE

## 2021-08-30 ENCOUNTER — OUTPATIENT (OUTPATIENT)
Dept: OUTPATIENT SERVICES | Facility: HOSPITAL | Age: 86
LOS: 1 days | Discharge: HOME | End: 2021-08-30

## 2021-08-30 VITALS
SYSTOLIC BLOOD PRESSURE: 158 MMHG | WEIGHT: 113 LBS | HEART RATE: 76 BPM | TEMPERATURE: 98 F | BODY MASS INDEX: 25.42 KG/M2 | HEIGHT: 56 IN | DIASTOLIC BLOOD PRESSURE: 83 MMHG

## 2021-08-30 PROCEDURE — 99213 OFFICE O/P EST LOW 20 MIN: CPT

## 2021-09-05 NOTE — PHYSICAL EXAM
[Restricted in physically strenuous activity but ambulatory and able to carry out work of a light or sedentary nature] : Status 1- Restricted in physically strenuous activity but ambulatory and able to carry out work of a light or sedentary nature, e.g., light house work, office work [Normal] : affect appropriate [de-identified] : The right breast is s/p lumpectomy.  There is no palpable abnormality.  The left breast and left axilla are normal. Large scar issue above surgical scar. [de-identified] : Status post right ankle fracture.

## 2021-09-05 NOTE — HISTORY OF PRESENT ILLNESS
[de-identified] :  83-year-old female is here today for a followup visit.  She has a history of a right breast solid papillary carcinoma, status post lumpectomy in 01/2012.  She subsequently developed recurrent solid papillary carcinoma, ER/PA positive and DCIS,  and had right breast lumpectomy on 02/12/2016.  She received partial breast radiotherapy with GUME catheter.  She took adjuvant endocrine therapy with anastrozole for a few months and developed a lot of body aching. She was given an option to try Exemestane but she could not.  She subsequently stopped medication. She was on Duloxetine for Fibromyalgia and stopped subsequently because her symptom resolved. \par \par On 1/2018, she had b/l diagnostic mammo and there was no suspicious finding.  On 7/21/17, she had right breast dx mammo which showed stable post surgical change.\par \par The patient also has history of osteopenia, and she has been taking Caltrate 600 plus D and Fosamax.  \par \par She follows with Dr. Isaac Lo at [facility] for osteoporosis. She stopped Fosamax and switched to Prolia. \par \par Today, she reports feeling well. She does not have nay breast related symptoms. \par  [de-identified] : She can not tolerate AI due to muscular skeletal side effects. She sees an endocrinologist for osteopenia and osteoporosis. She is getting Prolia injection every 6 months.\par Today, she reports feeling well. Sometime, she has pain in her right breast, lumpectomy site, which resolves on its own. \par \par 7/22/19:\par The patient is here for f/u visit. She feels well and has no breast related symptoms. In 1/2019, she had b/l mammo. There was no suspicious finding. She can not tolerate AI due to muscular skeletal side effects. She sees an endocrinologist for osteopenia and osteoporosis. She is getting Prolia injection every 6 months. She tripled down and had right ankle fracture a few months ago.\par \par 8/24/2020:\par The patient is here for f/u visit. She has a history of Recurrent right breast solid papillary carcinoma, ER/CO positive and ductal carcinoma in situ, status post lumpectomy, partial breast radiotherapy, unable to tolerate endocrine therapy. She had repeat mammo in 1/2020. There was no suspicious finding. She can not tolerate AI due to muscular skeletal side effects. She sees an endocrinologist for osteopenia and osteoporosis. She is getting Prolia injection every 6 months. She does not have new complains.\par \par 8/30/21\par The patient is here for f/u visit. She has a history of Recurrent right breast solid papillary carcinoma, ER/CO positive and ductal carcinoma in situ, status post lumpectomy, partial breast radiotherapy, unable to tolerate endocrine therapy. She offers no breast-related complaints. She had repeat mammo in 1/2021. There was no suspicious finding. \par She continues to get Prolia every 6 months with Dr. Dominguez.  Bone density was done at [facility] last year.\par She saw radiation oncologist, Dr. Nagel for her 5 yr and final follow up visit on 3/10/21.\par She received her 2nd COVID vaccine in 3/3021. She follows up with her PCP regularly.\par She no longer travel to [place] because her  passed away 2 yrs ago.

## 2021-09-05 NOTE — ASSESSMENT
[FreeTextEntry1] : 1. Recurrent right breast solid papillary carcinoma, ER/ID positive and ductal carcinoma in situ, status post lumpectomy, partial breast radiotherapy, unable to tolerate endocrine therapy.  NILO.\par 2. Osteopenia/Osteoporosis.\par \par RECOMMENDATION:  \par -- Breast exam today did not reveal palpable abnormality. Continue annual screening mammogram. (Next appt 1/2022)\par -- Followup with endocrinologist at [facility] for osteopenia/osteoporosis. Continue calcium and vitamin D supplement.\par -- She will come back for followup visit in 12 months.\par -- followup with PCP for healthy maintenance.\par \par Case was seen and discussed with Dr. Hong who agreed with the assessment and plan.\par

## 2021-09-05 NOTE — CONSULT LETTER
[Dear  ___] : Dear  [unfilled], [Courtesy Letter:] : I had the pleasure of seeing your patient, [unfilled], in my office today. [Please see my note below.] : Please see my note below. [Sincerely,] : Sincerely, [FreeTextEntry3] : Filipe Hong MD

## 2021-09-09 DIAGNOSIS — C50.411 MALIGNANT NEOPLASM OF UPPER-OUTER QUADRANT OF RIGHT FEMALE BREAST: ICD-10-CM

## 2022-02-22 ENCOUNTER — OUTPATIENT (OUTPATIENT)
Dept: OUTPATIENT SERVICES | Facility: HOSPITAL | Age: 87
LOS: 1 days | Discharge: HOME | End: 2022-02-22
Payer: MEDICARE

## 2022-02-22 ENCOUNTER — RESULT REVIEW (OUTPATIENT)
Age: 87
End: 2022-02-22

## 2022-02-22 DIAGNOSIS — Z12.31 ENCOUNTER FOR SCREENING MAMMOGRAM FOR MALIGNANT NEOPLASM OF BREAST: ICD-10-CM

## 2022-02-22 PROCEDURE — 77063 BREAST TOMOSYNTHESIS BI: CPT | Mod: 26

## 2022-02-22 PROCEDURE — 77067 SCR MAMMO BI INCL CAD: CPT | Mod: 26

## 2022-03-22 ENCOUNTER — APPOINTMENT (OUTPATIENT)
Dept: BREAST CENTER | Facility: CLINIC | Age: 87
End: 2022-03-22
Payer: MEDICARE

## 2022-03-22 PROCEDURE — 99212 OFFICE O/P EST SF 10 MIN: CPT

## 2022-03-22 NOTE — HISTORY OF PRESENT ILLNESS
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  Foci of DCIS intermediate nuclear grade, cribiform and micropapillary type.\par No RT - Dr. Nagel; No Tamoxifen, No med oncology, deferred by patient.  \par \par Her family history is significant for her sister with breast cancer at 74.  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  \par Estrogen receptor positive\par Progesterone receptor positive\par HER2 negative\par \par Status post Right NLOC 02/12/16 demonstrating 1.25 mm mod differentiated invasive solid papillary carcinoma, non extensive DCIS, low grade; with negative margins.  No LVI or perineural invasion, papilloma, ALH. \par \par Status post GUME Insertion 3/10/16; completed PBI on 3/18/16.  \par Was on AI by Dr. Hong; discontinued due to side effects.  \par \par INTERVAL HISTORY:\par 03/22/2022 --\par SARANYA RALPH is a 87 year old female patient who presents today in follow up for history of right breast cancer (2011; 2016).\par Since her last visit, she has no new breast related complaints. \par \par Most recent imaging:\par B/L Screening Mammo - 02/22/2022:\par -There are scattered areas of fibroglandular density.\par -There is an area of architectural distortion at the site of lumpectomy seen in the right breast.\par -There is no mammographic evidence of malignancy.\par BI-RADS Category 2:  Benign\par \par She presents today for evaluation and imaging review.

## 2022-03-22 NOTE — DATA REVIEWED
[FreeTextEntry1] : B/L Screening Mammo - 02/22/2022:\par MAMMOGRAM FINDINGS:\par Mammography was performed including the following views: bilateral mediolateral oblique with tomosynthesis, bilateral craniocaudal with tomosynthesis, bilateral mediolateral oblique, and right craniocaudal.  The examination includes digital synthetic 2D and digital tomosynthesis 3D images. Additional imaging analysis was performed using CAD (computer-aided detection) software.\par \par There are scattered areas of fibroglandular density.\par \par There is an area of architectural distortion at the site of lumpectomy seen in the right breast.\par \par No suspicious mass, grouping of calcifications, or other abnormality is identified.\par \par IMPRESSION:\par There is no mammographic evidence of malignancy.\par \par RECOMMENDATION:\par Unless otherwise indicated by clinical findings, annual screening mammography recommended.\par \par ASSESSMENT:\par BI-RADS Category 2:  Benign\par

## 2022-03-22 NOTE — ASSESSMENT
[FreeTextEntry1] : SARANYA is a kip 87 year old patient who presented today in follow up for a history of right breast cancer (2011; 2016).  \par She has been doing well with no new breast related complaints. \par \par Most recent imaging:\par B/L Screening Mammo - 02/22/2022:\par -There are scattered areas of fibroglandular density.\par -There is an area of architectural distortion at the site of lumpectomy seen in the right breast.\par -There is no mammographic evidence of malignancy.\par BI-RADS Category 2:  Benign\par \par Imaging with a bilateral screening mammogram will be due in February 2023, and that will be scheduled today.\par She will return for follow-up after\par \par PLAN:\par - B/L Screening Mammo - February 2023.\par -f/u after.\par \par \par

## 2022-03-22 NOTE — PHYSICAL EXAM
[Normocephalic] : normocephalic [Atraumatic] : atraumatic [No Supraclavicular Adenopathy] : no supraclavicular adenopathy [Examined in the supine and seated position] : examined in the supine and seated position [No dominant masses] : no dominant masses in right breast  [No dominant masses] : no dominant masses left breast [No Nipple Discharge] : no left nipple discharge [No Axillary Lymphadenopathy] : no left axillary lymphadenopathy [No Rashes] : no rashes [No Ulceration] : no ulceration [EOMI] : extra ocular movement intact [No Nipple Retraction] : no left nipple retraction [No Edema] : no edema [Breast Nipple Inversion] : nipples not inverted [Breast Nipple Retraction] : nipples not retracted [de-identified] : well healed surgical scars.\par palpable scar tissue.

## 2022-04-25 ENCOUNTER — APPOINTMENT (OUTPATIENT)
Dept: OTOLARYNGOLOGY | Facility: CLINIC | Age: 87
End: 2022-04-25
Payer: MEDICARE

## 2022-04-25 VITALS — BODY MASS INDEX: 25.42 KG/M2 | WEIGHT: 113 LBS | HEIGHT: 56 IN | TEMPERATURE: 96.6 F

## 2022-04-25 DIAGNOSIS — H90.5 UNSPECIFIED SENSORINEURAL HEARING LOSS: ICD-10-CM

## 2022-04-25 DIAGNOSIS — H61.23 IMPACTED CERUMEN, BILATERAL: ICD-10-CM

## 2022-04-25 PROCEDURE — 92567 TYMPANOMETRY: CPT

## 2022-04-25 PROCEDURE — 69210 REMOVE IMPACTED EAR WAX UNI: CPT

## 2022-04-25 PROCEDURE — 99203 OFFICE O/P NEW LOW 30 MIN: CPT | Mod: 25

## 2022-04-25 PROCEDURE — 92557 COMPREHENSIVE HEARING TEST: CPT

## 2022-04-25 NOTE — HISTORY OF PRESENT ILLNESS
[de-identified] : History of recurrent bilateral cerumen impaction.\par Complaining of bilateral ear congestion.

## 2022-04-25 NOTE — ASSESSMENT
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  Normal tympanograms.\par Medically cleared for hearing aid amplification.

## 2022-05-31 ENCOUNTER — APPOINTMENT (OUTPATIENT)
Dept: GYNECOLOGIC ONCOLOGY | Facility: CLINIC | Age: 87
End: 2022-05-31

## 2022-08-29 ENCOUNTER — APPOINTMENT (OUTPATIENT)
Dept: HEMATOLOGY ONCOLOGY | Facility: CLINIC | Age: 87
End: 2022-08-29

## 2022-10-17 ENCOUNTER — APPOINTMENT (OUTPATIENT)
Dept: OBGYN | Facility: CLINIC | Age: 87
End: 2022-10-17

## 2022-10-17 VITALS
TEMPERATURE: 97.7 F | DIASTOLIC BLOOD PRESSURE: 82 MMHG | BODY MASS INDEX: 25.87 KG/M2 | WEIGHT: 115 LBS | HEART RATE: 79 BPM | SYSTOLIC BLOOD PRESSURE: 132 MMHG | HEIGHT: 56 IN

## 2022-10-17 DIAGNOSIS — N39.0 URINARY TRACT INFECTION, SITE NOT SPECIFIED: ICD-10-CM

## 2022-10-17 PROCEDURE — G0101: CPT

## 2022-10-17 RX ORDER — CIPROFLOXACIN HYDROCHLORIDE 500 MG/1
500 TABLET, FILM COATED ORAL TWICE DAILY
Qty: 6 | Refills: 0 | Status: ACTIVE | COMMUNITY
Start: 2022-10-17 | End: 1900-01-01

## 2022-10-17 NOTE — PHYSICAL EXAM
[Appropriately responsive] : appropriately responsive [Alert] : alert [No Acute Distress] : no acute distress [No Lymphadenopathy] : no lymphadenopathy [Soft] : soft [Non-tender] : non-tender [Non-distended] : non-distended [No HSM] : No HSM [No Lesions] : no lesions [No Mass] : no mass [Oriented x3] : oriented x3 [Labia Majora] : normal [Labia Minora] : normal [No Bleeding] : There was no active vaginal bleeding [Uterine Adnexae] : normal [FreeTextEntry6] : Right breast 8:00 scar from prior lumpectomy, otherwise normal breast [Rt > Lt] : the right breast was larger than the left [Normal] : normal [No Masses] : no breast masses were palpable [Atrophy] : atrophy [Normal Position] : in a normal position

## 2022-10-17 NOTE — DISCUSSION/SUMMARY
[FreeTextEntry1] : 86 yo p 1001 for annual exam , h/o breast cancer,  h/o 2 uti's  in the past 2 month \par dexa due 2023\par mammogram as needed\par cirpo bid 3 days prn uti \par vaginal moisturizers advised \par patient to d/w hem/onc re using Premarin vaginal cream for dryness , however unlikely due to nature of her papillary breast cancer ER/NH Positive and DCIS

## 2022-10-17 NOTE — HISTORY OF PRESENT ILLNESS
[Patient reported mammogram was normal] : Patient reported mammogram was normal [Patient reported bone density results were abnormal] : Patient reported bone density results were abnormal [Patient reported colonoscopy was normal] : Patient reported colonoscopy was normal [postmenopausal] : postmenopausal [Y] : Positive pregnancy history [TextBox_4] : GYNHX\par No history of fibroids, cysts, or STDs\par h/o Breast cancer right breast [Mammogramdate] : 2-2022 [BoneDensityDate] : 2020 [TextBox_37] : osteoporosis [ColonoscopyDate] : 2019 [PGxTotal] : 1 [[facility] HospitalxFulerm] : 1 [FreeTextEntry1] :  [[facility] Hospitaliving] : 1

## 2023-02-24 ENCOUNTER — OUTPATIENT (OUTPATIENT)
Dept: OUTPATIENT SERVICES | Facility: HOSPITAL | Age: 88
LOS: 1 days | End: 2023-02-24
Payer: MEDICARE

## 2023-02-24 ENCOUNTER — RESULT REVIEW (OUTPATIENT)
Age: 88
End: 2023-02-24

## 2023-02-24 DIAGNOSIS — R92.8 OTHER ABNORMAL AND INCONCLUSIVE FINDINGS ON DIAGNOSTIC IMAGING OF BREAST: ICD-10-CM

## 2023-02-24 DIAGNOSIS — Z12.31 ENCOUNTER FOR SCREENING MAMMOGRAM FOR MALIGNANT NEOPLASM OF BREAST: ICD-10-CM

## 2023-02-24 DIAGNOSIS — Z00.8 ENCOUNTER FOR OTHER GENERAL EXAMINATION: ICD-10-CM

## 2023-02-24 PROCEDURE — 77067 SCR MAMMO BI INCL CAD: CPT | Mod: 26

## 2023-02-24 PROCEDURE — 77067 SCR MAMMO BI INCL CAD: CPT

## 2023-02-24 PROCEDURE — 77063 BREAST TOMOSYNTHESIS BI: CPT

## 2023-02-24 PROCEDURE — 77063 BREAST TOMOSYNTHESIS BI: CPT | Mod: 26

## 2023-03-03 ENCOUNTER — APPOINTMENT (OUTPATIENT)
Dept: BREAST CENTER | Facility: CLINIC | Age: 88
End: 2023-03-03

## 2023-03-07 ENCOUNTER — RESULT REVIEW (OUTPATIENT)
Age: 88
End: 2023-03-07

## 2023-03-07 ENCOUNTER — OUTPATIENT (OUTPATIENT)
Dept: OUTPATIENT SERVICES | Facility: HOSPITAL | Age: 88
LOS: 1 days | End: 2023-03-07
Payer: MEDICARE

## 2023-03-07 DIAGNOSIS — R92.8 OTHER ABNORMAL AND INCONCLUSIVE FINDINGS ON DIAGNOSTIC IMAGING OF BREAST: ICD-10-CM

## 2023-03-07 PROCEDURE — G0279: CPT | Mod: 26

## 2023-03-07 PROCEDURE — 77065 DX MAMMO INCL CAD UNI: CPT | Mod: RT

## 2023-03-07 PROCEDURE — 77065 DX MAMMO INCL CAD UNI: CPT | Mod: 26,RT

## 2023-03-07 PROCEDURE — 76642 ULTRASOUND BREAST LIMITED: CPT | Mod: 26,RT

## 2023-03-07 PROCEDURE — 76642 ULTRASOUND BREAST LIMITED: CPT | Mod: RT

## 2023-03-07 PROCEDURE — G0279: CPT

## 2023-03-14 ENCOUNTER — APPOINTMENT (OUTPATIENT)
Dept: BREAST CENTER | Facility: CLINIC | Age: 88
End: 2023-03-14
Payer: MEDICARE

## 2023-03-14 ENCOUNTER — RESULT REVIEW (OUTPATIENT)
Age: 88
End: 2023-03-14

## 2023-03-14 ENCOUNTER — OUTPATIENT (OUTPATIENT)
Dept: OUTPATIENT SERVICES | Facility: HOSPITAL | Age: 88
LOS: 1 days | End: 2023-03-14
Payer: MEDICARE

## 2023-03-14 DIAGNOSIS — R92.8 OTHER ABNORMAL AND INCONCLUSIVE FINDINGS ON DIAGNOSTIC IMAGING OF BREAST: ICD-10-CM

## 2023-03-14 PROCEDURE — 88341 IMHCHEM/IMCYTCHM EA ADD ANTB: CPT

## 2023-03-14 PROCEDURE — 19083 BX BREAST 1ST LESION US IMAG: CPT | Mod: RT

## 2023-03-14 PROCEDURE — 88341 IMHCHEM/IMCYTCHM EA ADD ANTB: CPT | Mod: 26,59

## 2023-03-14 PROCEDURE — 88305 TISSUE EXAM BY PATHOLOGIST: CPT

## 2023-03-14 PROCEDURE — 88360 TUMOR IMMUNOHISTOCHEM/MANUAL: CPT

## 2023-03-14 PROCEDURE — A4648: CPT

## 2023-03-14 PROCEDURE — 88342 IMHCHEM/IMCYTCHM 1ST ANTB: CPT | Mod: 26,59

## 2023-03-14 PROCEDURE — 77065 DX MAMMO INCL CAD UNI: CPT | Mod: RT

## 2023-03-14 PROCEDURE — 88305 TISSUE EXAM BY PATHOLOGIST: CPT | Mod: 26

## 2023-03-14 PROCEDURE — 88342 IMHCHEM/IMCYTCHM 1ST ANTB: CPT

## 2023-03-14 PROCEDURE — 88360 TUMOR IMMUNOHISTOCHEM/MANUAL: CPT | Mod: 26

## 2023-03-14 PROCEDURE — 77065 DX MAMMO INCL CAD UNI: CPT | Mod: 26,RT

## 2023-03-15 LAB — SURGICAL PATHOLOGY STUDY: SIGNIFICANT CHANGE UP

## 2023-03-23 ENCOUNTER — APPOINTMENT (OUTPATIENT)
Dept: BREAST CENTER | Facility: CLINIC | Age: 88
End: 2023-03-23
Payer: MEDICARE

## 2023-03-23 DIAGNOSIS — C50.911 MALIGNANT NEOPLASM OF UNSPECIFIED SITE OF RIGHT FEMALE BREAST: ICD-10-CM

## 2023-03-23 DIAGNOSIS — N63.10 UNSPECIFIED LUMP IN THE RIGHT BREAST, UNSPECIFIED QUADRANT: ICD-10-CM

## 2023-03-23 PROCEDURE — 99215 OFFICE O/P EST HI 40 MIN: CPT

## 2023-03-23 NOTE — HISTORY OF PRESENT ILLNESS
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  Foci of DCIS intermediate nuclear grade, cribiform and micropapillary type.\par No RT - Dr. Nagel; No Tamoxifen, No med oncology, deferred by patient.  \par \par History of right breast solid papillary carcinoma (+/+/-) s/p WLE on 02/12/16 demonstrating 1.25 mm mod differentiated invasive solid papillary carcinoma, non extensive DCIS, low grade; with negative margins.  No LVI or perineural invasion, papilloma, ALH. \par Status post GUME Insertion 3/10/16; completed PBI on 3/18/16.  \par Was on AI by Dr. Hong; discontinued due to side effects.  \par \par Her family history is significant for her sister with breast cancer at 74.  \par \par \par Most recent imaging is as follows:\par 2/22/22: B/L Screening Mammo --> BIRADS 2\par -There are scattered areas of fibroglandular density.\par -There is an area of architectural distortion at the site of lumpectomy seen in the right breast.\par -There is no mammographic evidence of malignancy.\par \par \par 2/24/23: B scg mmg --> BIRADS 0\par Scattered areas of fibroglandular density\par Stable focal asymmetry in the right breast is benign finding\par Mass in the superior right breast requires additional evaluation\par \par 3/7/23: R mmg and US --> BIRADS 4\par Dense\par There is a round hyperdense mass measuring 0.8 cm in its greatest dimension is noted in superior aspect of the right breast at area of mammographic concern. This mass correlates with the sonographic finding below.\par At 12:00 N 10 there is a round hypoechoic mass measuring 1.1 x 0.8 x 0.6 cm correlating with mammographic finding above --> REC BX\par \par 3/14/23: USGB, R\par Recurrent intermediate nuclear grade solid papillary carcinoma, focally infarcted and with mucin production.\par (receptors pending)\par (minicork) (malignant concordant)\par \par Denies any current complaints. No new changes to her breasts.

## 2023-03-23 NOTE — PHYSICAL EXAM
[Normocephalic] : normocephalic [EOMI] : extra ocular movement intact [No Supraclavicular Adenopathy] : no supraclavicular adenopathy [No Cervical Adenopathy] : no cervical adenopathy [Examined in the supine and seated position] : examined in the supine and seated position [No dominant masses] : no dominant masses left breast [No Nipple Retraction] : no left nipple retraction [No Nipple Discharge] : no left nipple discharge [No Axillary Lymphadenopathy] : no left axillary lymphadenopathy [Soft] : abdomen soft [No Rashes] : no rashes [No Ulceration] : no ulceration [de-identified] : Nl respiraitons [de-identified] : Right breast post biopsy ecchymosis and 12:00 approx 1 cm mass

## 2023-03-23 NOTE — ASSESSMENT
[FreeTextEntry1] : Patient is a 88F with history of right papillary carcinoma s/p WLE in 1/2012, with no radiation or tamoxifen. She then had right papillary carcinoma in 2/2016 that was excised and found to have invasion, s/p PBI. She was on anastrozole which was discontinued due to SE.  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  I had a lengthy discussion with this patient regarding diagnostic results, impressions, recommendations, risks and benefits. I have explained to the patient that the needle biopsy yielded a diagnosis of invasive breast cancer and that usually surgical removal is necessary for definitive treatment.  We reviewed the usual treatment of breast cancer, including surgery, radiation, chemotherapy, and anti-estrogen treatment.  Surgical options were reviewed, including a wide excision to negative margins with SLNB versus a mastectomy with or without reconstruction. Patient is not interested in pursuing a SLNB and so we also discussed omitting a SLNB given her age as per Choosing Wisely guidelines by the [facility]. Patient wishes to omit a SLNB at this time.  The general indications for the use of adjuvant hormonal therapy, chemotherapy and targeted therapies were discussed. It was explained that medical treatments are dependent on the pathology results including the receptor status. We reviewed that her cancer is estrogen positive, and that necessitates anti-estrogen therapy for 5 years. She will be referred to medical oncology for an interpretation of her results and further treatment after the surgery.  The patient will meet with a radiation oncologist if indicated.  Patient is only interested in breast conservation without a SLNB at this time. We reviewed that the risks of the operation include, but are not limited to damage to surrounding structures, infection, bleeding, cosmetic deformity, sensory changes, need for re-excision, and anesthetic related complications. She understands that with lumpectomy, if margins are positive, additional surgery may be required.  All questions and concerns were answered in detail.  Total time spent on encounter was greater than 45 minutes, which included face to face time with the patient, performing an exam, reviewing previous medical records including imaging/ pathology, documenting in patient record and coordinating care/imaging. Greater than 50% of the encounter was spent on counseling and coordination of her breast issue.

## 2023-03-30 ENCOUNTER — OUTPATIENT (OUTPATIENT)
Dept: OUTPATIENT SERVICES | Facility: HOSPITAL | Age: 88
LOS: 1 days | End: 2023-03-30
Payer: MEDICARE

## 2023-03-30 ENCOUNTER — RESULT REVIEW (OUTPATIENT)
Age: 88
End: 2023-03-30

## 2023-03-30 VITALS
RESPIRATION RATE: 16 BRPM | DIASTOLIC BLOOD PRESSURE: 70 MMHG | OXYGEN SATURATION: 98 % | SYSTOLIC BLOOD PRESSURE: 150 MMHG | HEART RATE: 78 BPM | WEIGHT: 115.08 LBS | HEIGHT: 60 IN | TEMPERATURE: 98 F

## 2023-03-30 DIAGNOSIS — Z98.890 OTHER SPECIFIED POSTPROCEDURAL STATES: Chronic | ICD-10-CM

## 2023-03-30 DIAGNOSIS — C50.919 MALIGNANT NEOPLASM OF UNSPECIFIED SITE OF UNSPECIFIED FEMALE BREAST: ICD-10-CM

## 2023-03-30 DIAGNOSIS — Z01.818 ENCOUNTER FOR OTHER PREPROCEDURAL EXAMINATION: ICD-10-CM

## 2023-03-30 LAB
ALBUMIN SERPL ELPH-MCNC: 4.5 G/DL — SIGNIFICANT CHANGE UP (ref 3.5–5.2)
ALP SERPL-CCNC: 42 U/L — SIGNIFICANT CHANGE UP (ref 30–115)
ALT FLD-CCNC: 12 U/L — SIGNIFICANT CHANGE UP (ref 0–41)
ANION GAP SERPL CALC-SCNC: 11 MMOL/L — SIGNIFICANT CHANGE UP (ref 7–14)
APTT BLD: 31.1 SEC — SIGNIFICANT CHANGE UP (ref 27–39.2)
AST SERPL-CCNC: 18 U/L — SIGNIFICANT CHANGE UP (ref 0–41)
BASOPHILS # BLD AUTO: 0.03 K/UL — SIGNIFICANT CHANGE UP (ref 0–0.2)
BASOPHILS NFR BLD AUTO: 0.4 % — SIGNIFICANT CHANGE UP (ref 0–1)
BILIRUB SERPL-MCNC: 0.5 MG/DL — SIGNIFICANT CHANGE UP (ref 0.2–1.2)
BUN SERPL-MCNC: 18 MG/DL — SIGNIFICANT CHANGE UP (ref 10–20)
CALCIUM SERPL-MCNC: 9.5 MG/DL — SIGNIFICANT CHANGE UP (ref 8.4–10.5)
CHLORIDE SERPL-SCNC: 99 MMOL/L — SIGNIFICANT CHANGE UP (ref 98–110)
CO2 SERPL-SCNC: 28 MMOL/L — SIGNIFICANT CHANGE UP (ref 17–32)
CREAT SERPL-MCNC: 0.8 MG/DL — SIGNIFICANT CHANGE UP (ref 0.7–1.5)
EGFR: 71 ML/MIN/1.73M2 — SIGNIFICANT CHANGE UP
EOSINOPHIL # BLD AUTO: 0.03 K/UL — SIGNIFICANT CHANGE UP (ref 0–0.7)
EOSINOPHIL NFR BLD AUTO: 0.4 % — SIGNIFICANT CHANGE UP (ref 0–8)
GLUCOSE SERPL-MCNC: 151 MG/DL — HIGH (ref 70–99)
HCT VFR BLD CALC: 45.1 % — SIGNIFICANT CHANGE UP (ref 37–47)
HGB BLD-MCNC: 14.5 G/DL — SIGNIFICANT CHANGE UP (ref 12–16)
IMM GRANULOCYTES NFR BLD AUTO: 0.4 % — HIGH (ref 0.1–0.3)
INR BLD: 1.02 RATIO — SIGNIFICANT CHANGE UP (ref 0.65–1.3)
LYMPHOCYTES # BLD AUTO: 1.3 K/UL — SIGNIFICANT CHANGE UP (ref 1.2–3.4)
LYMPHOCYTES # BLD AUTO: 18 % — LOW (ref 20.5–51.1)
MCHC RBC-ENTMCNC: 30 PG — SIGNIFICANT CHANGE UP (ref 27–31)
MCHC RBC-ENTMCNC: 32.2 G/DL — SIGNIFICANT CHANGE UP (ref 32–37)
MCV RBC AUTO: 93.2 FL — SIGNIFICANT CHANGE UP (ref 81–99)
MONOCYTES # BLD AUTO: 0.45 K/UL — SIGNIFICANT CHANGE UP (ref 0.1–0.6)
MONOCYTES NFR BLD AUTO: 6.2 % — SIGNIFICANT CHANGE UP (ref 1.7–9.3)
NEUTROPHILS # BLD AUTO: 5.39 K/UL — SIGNIFICANT CHANGE UP (ref 1.4–6.5)
NEUTROPHILS NFR BLD AUTO: 74.6 % — SIGNIFICANT CHANGE UP (ref 42.2–75.2)
NRBC # BLD: 0 /100 WBCS — SIGNIFICANT CHANGE UP (ref 0–0)
PLATELET # BLD AUTO: 193 K/UL — SIGNIFICANT CHANGE UP (ref 130–400)
POTASSIUM SERPL-MCNC: 4 MMOL/L — SIGNIFICANT CHANGE UP (ref 3.5–5)
POTASSIUM SERPL-SCNC: 4 MMOL/L — SIGNIFICANT CHANGE UP (ref 3.5–5)
PROT SERPL-MCNC: 6.9 G/DL — SIGNIFICANT CHANGE UP (ref 6–8)
PROTHROM AB SERPL-ACNC: 11.6 SEC — SIGNIFICANT CHANGE UP (ref 9.95–12.87)
RBC # BLD: 4.84 M/UL — SIGNIFICANT CHANGE UP (ref 4.2–5.4)
RBC # FLD: 12.6 % — SIGNIFICANT CHANGE UP (ref 11.5–14.5)
SODIUM SERPL-SCNC: 138 MMOL/L — SIGNIFICANT CHANGE UP (ref 135–146)
WBC # BLD: 7.23 K/UL — SIGNIFICANT CHANGE UP (ref 4.8–10.8)
WBC # FLD AUTO: 7.23 K/UL — SIGNIFICANT CHANGE UP (ref 4.8–10.8)

## 2023-03-30 PROCEDURE — 93005 ELECTROCARDIOGRAM TRACING: CPT

## 2023-03-30 PROCEDURE — 80053 COMPREHEN METABOLIC PANEL: CPT

## 2023-03-30 PROCEDURE — 71046 X-RAY EXAM CHEST 2 VIEWS: CPT

## 2023-03-30 PROCEDURE — 85610 PROTHROMBIN TIME: CPT

## 2023-03-30 PROCEDURE — 71046 X-RAY EXAM CHEST 2 VIEWS: CPT | Mod: 26

## 2023-03-30 PROCEDURE — 85730 THROMBOPLASTIN TIME PARTIAL: CPT

## 2023-03-30 PROCEDURE — 85025 COMPLETE CBC W/AUTO DIFF WBC: CPT

## 2023-03-30 PROCEDURE — 99214 OFFICE O/P EST MOD 30 MIN: CPT | Mod: 25

## 2023-03-30 PROCEDURE — 93010 ELECTROCARDIOGRAM REPORT: CPT

## 2023-03-30 PROCEDURE — 36415 COLL VENOUS BLD VENIPUNCTURE: CPT

## 2023-03-30 NOTE — H&P PST ADULT - PRIMARY CARE PROVIDER
DR. ASHER VAZQUEZ [phone]  [phone] ( 01/2023) DR. ASHER VAZQUEZ [phone]  [phone] (01/2023)

## 2023-03-30 NOTE — H&P PST ADULT - NSICDXPASTMEDICALHX_GEN_ALL_CORE_FT
PAST MEDICAL HISTORY:  GERD (gastroesophageal reflux disease)     H/O fibromyalgia     Hyperlipidemia     Hypertension     Osteoporosis     S/P radiation therapy

## 2023-03-30 NOTE — H&P PST ADULT - HISTORY OF PRESENT ILLNESS
Adenike Currie is a 89 yo female with PMH of HTN, OP, Sensori hearing  loss, right breast solid papillary CA, S/P Lumpectomy in 1/2012she subsequently developed recurrent solid papillary CA ER/IL positive and DCIs and had right breast lumpectomy 02/12/2016 and she received partial breast radiotherapy and adjuvant endocrinae therapy. Patient is going for the above surgery due to recurrence.   Patient denies any cp, sob, palpitations, fever, cough, URI, abdominal pains, N/V, UTI, Rashes or open wounds.  As per patient exercise tolerance of 1/2 fos walks with out sob except holding on the side rail.   Patient denies any s/s covid 19 and reports no contact with known positive people. Instructed to continue to self monitor and report any concerns to MD. Pt will continue to practice self isolation and  exposure control measures pre op   Anesthesia Alert  NO--Difficult Airway  NO--History of neck surgery or radiation  NO--Limited ROM of neck  NO--History of Malignant hyperthermia  NO--Personal or family history of Pseudocholinesterase deficiency  NO--Prior Anesthesia Complication  NO--Latex Allergy  NO--Loose teeth  NO--History of Rheumatoid Arthritis  NO--JOSE ENRIQUE  NO--Bleeding Risk.   Pt instructed to stop vitamins/supplements/herbal medications for one week prior to surgery  As per patient this is the complete medical, surgical history and medications.     Adenike Currie is a 87 yo female with PMH of HTN, OP, Sensori hearing  loss, right breast solid papillary CA, S/P Lumpectomy in 1/2012 she subsequently developed recurrent solid papillary CA ER/SD positive and DCIs and had right breast lumpectomy 02/12/2016 and she received partial breast radiotherapy and adjuvant endocrinae therapy. Patient is here today for the above procedure due to due to recurrence of right breast solid papillary CA at 12'clock position.   Patient denies any cp, sob, palpitations, fever, cough, URI, abdominal pains, N/V, UTI, Rashes or open wounds.  As per patient exercise tolerance of 1/2 fos walks with out sob except holding on the side rail.   Patient denies any s/s covid 19 and reports no contact with known positive people. Instructed to continue to self monitor and report any concerns to MD. Pt will continue to practice self isolation and  exposure control measures pre op   Anesthesia Alert  NO--Difficult Airway  NO--History of neck surgery or radiation  NO--Limited ROM of neck  NO--History of Malignant hyperthermia  NO--Personal or family history of Pseudocholinesterase deficiency  NO--Prior Anesthesia Complication  NO--Latex Allergy  NO--Loose teeth  NO--History of Rheumatoid Arthritis  NO--JOSE ENRIQUE  NO--Bleeding Risk.   Pt instructed to stop vitamins/supplements/herbal medications for one week prior to surgery  As per patient this is the complete medical, surgical history and medications.     Adenike Currie is a 89 yo female with PMH of HTN, OP, Sensorial hearing  loss, right breast solid papillary CA, S/P Lumpectomy in 1/2012 she subsequently developed recurrent solid papillary CA ER/MS positive and DCIs and had right breast lumpectomy 02/12/2016 and she received partial breast radiotherapy and adjuvant endocrinae therapy. Patient is here today for the above procedure due to due to recurrence of right breast solid papillary CA at 12'clock position.   Patient denies any cp, sob, palpitations, fever, cough, URI, abdominal pains, N/V, UTI, Rashes or open wounds.  As per patient exercise tolerance of 1/2 fos walks with out sob except holding on the side rail.   Patient denies any s/s covid 19 and reports no contact with known positive people. Instructed to continue to self monitor and report any concerns to MD. Pt will continue to practice self isolation and  exposure control measures pre op   Anesthesia Alert  NO--Difficult Airway  NO--History of neck surgery or radiation  NO--Limited ROM of neck  NO--History of Malignant hyperthermia  NO--Personal or family history of Pseudocholinesterase deficiency  NO--Prior Anesthesia Complication  NO--Latex Allergy  NO--Loose teeth  NO--History of Rheumatoid Arthritis  NO--JOSE ENRIQUE  NO--Bleeding Risk.   Pt instructed to stop vitamins/supplements/herbal medications for one week prior to surgery  As per patient this is the complete medical, surgical history and medications.

## 2023-03-30 NOTE — H&P PST ADULT - REASON FOR ADMISSION
Case Type: OP  Suite: [id number]  Proceduralist: Viola Mckeon  Confirmed Surgery Date Time: 04-   PAST Date Time: 03- - 15:00  Procedure: Right Breast Wide Local Excision with Needle Localization  Laterality: Right  Length of Procedure: 90 Minutes  Anesthesia Type: Local Standby

## 2023-03-30 NOTE — H&P PST ADULT - NEUROLOGICAL
normal/cranial nerves II-XII intact/sensation intact/responds to pain/responds to verbal commands/deep reflexes intact negative

## 2023-03-30 NOTE — H&P PST ADULT - NS PRO FEM REPRO PAP RESULTS
How Severe Is Your Skin Lesion?: moderate Have Your Skin Lesions Been Treated?: not been treated Is This A New Presentation, Or A Follow-Up?: Skin Lesions How Severe Is Your Skin Lesion?: mild normal

## 2023-03-30 NOTE — H&P PST ADULT - NSANTHOSAYNRD_GEN_A_CORE
No. JOSE ENRIQUE screening performed.  STOP BANG Legend: 0-2 = LOW Risk; 3-4 = INTERMEDIATE Risk; 5-8 = HIGH Risk

## 2023-03-31 DIAGNOSIS — Z01.818 ENCOUNTER FOR OTHER PREPROCEDURAL EXAMINATION: ICD-10-CM

## 2023-03-31 DIAGNOSIS — C50.919 MALIGNANT NEOPLASM OF UNSPECIFIED SITE OF UNSPECIFIED FEMALE BREAST: ICD-10-CM

## 2023-04-06 ENCOUNTER — NON-APPOINTMENT (OUTPATIENT)
Age: 88
End: 2023-04-06

## 2023-04-12 ENCOUNTER — NON-APPOINTMENT (OUTPATIENT)
Age: 88
End: 2023-04-12

## 2023-04-20 NOTE — ASU PATIENT PROFILE, ADULT - FALL HARM RISK - UNIVERSAL INTERVENTIONS
Bed in lowest position, wheels locked, appropriate side rails in place/Call bell, personal items and telephone in reach/Instruct patient to call for assistance before getting out of bed or chair/Non-slip footwear when patient is out of bed/[place] to call system/Physically safe environment - no spills, clutter or unnecessary equipment/Purposeful Proactive Rounding/Room/bathroom lighting operational, light cord in reach

## 2023-04-21 ENCOUNTER — RESULT REVIEW (OUTPATIENT)
Age: 88
End: 2023-04-21

## 2023-04-21 ENCOUNTER — OUTPATIENT (OUTPATIENT)
Dept: INPATIENT UNIT | Facility: HOSPITAL | Age: 88
LOS: 1 days | Discharge: HOME | End: 2023-04-21
Payer: MEDICARE

## 2023-04-21 ENCOUNTER — TRANSCRIPTION ENCOUNTER (OUTPATIENT)
Age: 88
End: 2023-04-21

## 2023-04-21 ENCOUNTER — APPOINTMENT (OUTPATIENT)
Dept: BREAST CENTER | Facility: AMBULATORY SURGERY CENTER | Age: 88
End: 2023-04-21
Payer: MEDICARE

## 2023-04-21 VITALS
HEART RATE: 65 BPM | SYSTOLIC BLOOD PRESSURE: 106 MMHG | RESPIRATION RATE: 22 BRPM | DIASTOLIC BLOOD PRESSURE: 55 MMHG | OXYGEN SATURATION: 96 %

## 2023-04-21 VITALS
TEMPERATURE: 98 F | SYSTOLIC BLOOD PRESSURE: 158 MMHG | WEIGHT: 115.08 LBS | OXYGEN SATURATION: 97 % | HEIGHT: 60 IN | RESPIRATION RATE: 20 BRPM | HEART RATE: 73 BPM | DIASTOLIC BLOOD PRESSURE: 77 MMHG

## 2023-04-21 DIAGNOSIS — C50.911 MALIGNANT NEOPLASM OF UNSPECIFIED SITE OF RIGHT FEMALE BREAST: ICD-10-CM

## 2023-04-21 DIAGNOSIS — C50.411 MALIGNANT NEOPLASM OF UPPER-OUTER QUADRANT OF RIGHT FEMALE BREAST: ICD-10-CM

## 2023-04-21 DIAGNOSIS — Z98.890 OTHER SPECIFIED POSTPROCEDURAL STATES: Chronic | ICD-10-CM

## 2023-04-21 DIAGNOSIS — M79.7 FIBROMYALGIA: ICD-10-CM

## 2023-04-21 DIAGNOSIS — Z17.0 ESTROGEN RECEPTOR POSITIVE STATUS [ER+]: ICD-10-CM

## 2023-04-21 DIAGNOSIS — M81.0 AGE-RELATED OSTEOPOROSIS WITHOUT CURRENT PATHOLOGICAL FRACTURE: ICD-10-CM

## 2023-04-21 DIAGNOSIS — I10 ESSENTIAL (PRIMARY) HYPERTENSION: ICD-10-CM

## 2023-04-21 DIAGNOSIS — E78.5 HYPERLIPIDEMIA, UNSPECIFIED: ICD-10-CM

## 2023-04-21 DIAGNOSIS — Z92.3 PERSONAL HISTORY OF IRRADIATION: ICD-10-CM

## 2023-04-21 DIAGNOSIS — K21.9 GASTRO-ESOPHAGEAL REFLUX DISEASE WITHOUT ESOPHAGITIS: ICD-10-CM

## 2023-04-21 DIAGNOSIS — Z88.8 ALLERGY STATUS TO OTHER DRUGS, MEDICAMENTS AND BIOLOGICAL SUBSTANCES: ICD-10-CM

## 2023-04-21 DIAGNOSIS — C50.919 MALIGNANT NEOPLASM OF UNSPECIFIED SITE OF UNSPECIFIED FEMALE BREAST: ICD-10-CM

## 2023-04-21 PROCEDURE — 19281 PERQ DEVICE BREAST 1ST IMAG: CPT | Mod: RT

## 2023-04-21 PROCEDURE — 88341 IMHCHEM/IMCYTCHM EA ADD ANTB: CPT | Mod: 26

## 2023-04-21 PROCEDURE — 19301 PARTIAL MASTECTOMY: CPT | Mod: RT

## 2023-04-21 PROCEDURE — 88341 IMHCHEM/IMCYTCHM EA ADD ANTB: CPT

## 2023-04-21 PROCEDURE — A4648: CPT

## 2023-04-21 PROCEDURE — 88360 TUMOR IMMUNOHISTOCHEM/MANUAL: CPT

## 2023-04-21 PROCEDURE — C1889: CPT

## 2023-04-21 PROCEDURE — 88305 TISSUE EXAM BY PATHOLOGIST: CPT | Mod: 26

## 2023-04-21 PROCEDURE — 88305 TISSUE EXAM BY PATHOLOGIST: CPT

## 2023-04-21 PROCEDURE — 88360 TUMOR IMMUNOHISTOCHEM/MANUAL: CPT | Mod: 26,59

## 2023-04-21 PROCEDURE — 88342 IMHCHEM/IMCYTCHM 1ST ANTB: CPT | Mod: XU

## 2023-04-21 PROCEDURE — 88342 IMHCHEM/IMCYTCHM 1ST ANTB: CPT | Mod: 26

## 2023-04-21 RX ORDER — HYDROMORPHONE HYDROCHLORIDE 2 MG/ML
0.25 INJECTION INTRAMUSCULAR; INTRAVENOUS; SUBCUTANEOUS
Refills: 0 | Status: DISCONTINUED | OUTPATIENT
Start: 2023-04-21 | End: 2023-04-21

## 2023-04-21 RX ORDER — CHLORTHALIDONE 50 MG
1 TABLET ORAL
Qty: 0 | Refills: 0 | DISCHARGE

## 2023-04-21 RX ORDER — OXYCODONE HYDROCHLORIDE 5 MG/1
5 TABLET ORAL ONCE
Refills: 0 | Status: DISCONTINUED | OUTPATIENT
Start: 2023-04-21 | End: 2023-04-21

## 2023-04-21 RX ORDER — SODIUM CHLORIDE 9 MG/ML
1000 INJECTION, SOLUTION INTRAVENOUS
Refills: 0 | Status: DISCONTINUED | OUTPATIENT
Start: 2023-04-21 | End: 2023-04-21

## 2023-04-21 RX ORDER — OLMESARTAN MEDOXOMIL 5 MG/1
1 TABLET, FILM COATED ORAL
Qty: 0 | Refills: 0 | DISCHARGE

## 2023-04-21 RX ORDER — ONDANSETRON 8 MG/1
4 TABLET, FILM COATED ORAL ONCE
Refills: 0 | Status: DISCONTINUED | OUTPATIENT
Start: 2023-04-21 | End: 2023-04-21

## 2023-04-21 NOTE — BRIEF OPERATIVE NOTE - NSICDXBRIEFPROCEDURE_GEN_ALL_CORE_FT
PROCEDURES:  Wide excision of mass of right breast with wire guidance 21-Apr-2023 14:12:46  Juana Dukes

## 2023-04-21 NOTE — ASU DISCHARGE PLAN (ADULT/PEDIATRIC) - CARE PROVIDER_API CALL
Viola Mckeon (DO)  Surgery  Compr Breast  [address], [facility] [address]nd [place] [address]  Phone: [phone]  Fax: [phone]  Follow Up Time:

## 2023-04-21 NOTE — CHART NOTE - NSCHARTNOTEFT_GEN_A_CORE
PACU ANESTHESIA ADMISSION NOTE      Procedure: Wide excision of mass of right breast with wire guidance      Post op diagnosis:  Right breast CA    ____  Intubated  TV:______       Rate: ______      FiO2: ______    __x__  Patent Airway    __x__  Full return of protective reflexes    _x___  Full recovery from anesthesia / back to baseline     Vitals:   T:   97.9F        R:     18             BP:  113/55                Sat:      98%             P: 68      Mental Status:  __x__ Awake   __x___ Alert   _____ Drowsy   _____ Sedated    Nausea/Vomiting:  __x__ NO  ______Yes,   See Post - Op Orders          Pain Scale (0-10):  0/10____    Treatment: ____ None    __x__ See Post - Op/PCA Orders    Post - Operative Fluids:   ____ Oral   __x__ See Post - Op Orders    Plan: Discharge:   _x___Home       _____Floor     _____Critical Care    _____  Other:_________________    Comments: Pt tolerated procedure well, no anesthesia related complications. Care of pt endorsed to PACU, report given to PACU RN. Discharge when criteria are met.

## 2023-04-21 NOTE — PRE-ANESTHESIA EVALUATION ADULT - NSANTHPMHFT_GEN_ALL_CORE
METS>4 without CP/palpitations/sob  Denies orthopnea  prior right breast lumpectomies, h/o radiation

## 2023-04-21 NOTE — ASU DISCHARGE PLAN (ADULT/PEDIATRIC) - ASU DC SPECIAL INSTRUCTIONSFT
You have skin glue on your incision, please do not scrub your incision as the glue will come off on it's own. You may shower as usual. Please wear the bra provided to you at all times except when showering. You may take Tylenol/Motrin every 4-6 hours for pain as needed with food.

## 2023-04-21 NOTE — BRIEF OPERATIVE NOTE - OPERATION/FINDINGS
Excision of right sided breast mass using needle localization. Superior, inferior, medial, and lateral margins sent for pathology.

## 2023-04-25 LAB — SURGICAL PATHOLOGY STUDY: SIGNIFICANT CHANGE UP

## 2023-05-02 ENCOUNTER — APPOINTMENT (OUTPATIENT)
Dept: BREAST CENTER | Facility: CLINIC | Age: 88
End: 2023-05-02
Payer: MEDICARE

## 2023-05-02 VITALS
DIASTOLIC BLOOD PRESSURE: 100 MMHG | WEIGHT: 115 LBS | SYSTOLIC BLOOD PRESSURE: 161 MMHG | HEIGHT: 56 IN | BODY MASS INDEX: 25.87 KG/M2

## 2023-05-02 DIAGNOSIS — R21 RASH AND OTHER NONSPECIFIC SKIN ERUPTION: ICD-10-CM

## 2023-05-02 PROBLEM — Z92.3 PERSONAL HISTORY OF IRRADIATION: Chronic | Status: ACTIVE | Noted: 2023-03-30

## 2023-05-02 PROBLEM — Z87.39 PERSONAL HISTORY OF OTHER DISEASES OF THE MUSCULOSKELETAL SYSTEM AND CONNECTIVE TISSUE: Chronic | Status: ACTIVE | Noted: 2023-03-30

## 2023-05-02 PROCEDURE — 99024 POSTOP FOLLOW-UP VISIT: CPT

## 2023-05-02 RX ORDER — KETOCONAZOLE 20 MG/G
2 CREAM TOPICAL TWICE DAILY
Qty: 1 | Refills: 0 | Status: ACTIVE | COMMUNITY
Start: 2023-05-02 | End: 1900-01-01

## 2023-05-02 NOTE — ASSESSMENT
[FreeTextEntry1] : Patient is a 88F with history of right papillary carcinoma s/p WLE in 1/2012, with no radiation or tamoxifen. She then had right papillary carcinoma in 2/2016 that was excised and found to have invasion, s/p PBI. She was on anastrozole which was discontinued due to SE.\par \par She now presents with recurrent right breast invasive papillary carcinoma (+/+/-) s/p WLE on 4/21/23 with pathology showing IDC (1.2mm) adjacent to focus of recurrent invasive papillary carcinoma (7mm). Solid papillary carcinoma extends focally to involve lateral margin.\par \par I had a lengthy discussion with patient regarding results. We discussed that her lateral margin was focally positive. We discussed options including re-excision of margins. Patient interested in proceed with re-excision at this time.\par \par All questions and concerns were answered in detail.\par \par Patient is for right breast lateral margin re-excision.\par Antifungal prescribed for rash.\par \par Total time spent on encounter was greater than 20 minutes, which included face to face time with the patient, performing an exam, reviewing previous medical records including imaging/ pathology, documenting in patient record and coordinating care/imaging. Greater than 50% of the encounter was spent on counseling and coordination of her breast issue.

## 2023-05-02 NOTE — PHYSICAL EXAM
[Normocephalic] : normocephalic [EOMI] : extra ocular movement intact [de-identified] : Nl respirations [de-identified] : Incision site healing well with no signs of infection or dehiscence [de-identified] : medial breast fold/sternal fungal rash

## 2023-05-02 NOTE — HISTORY OF PRESENT ILLNESS
[FreeTextEntry1] : Patient is a 88F with history of right breast cancer x 2 (2012, 2016) with recent diagnosis of right recurrent invasive papillay carcinoma (+/+/-) s/p WLE on 4/21/23 with final pathology showing IDC (1.2mm) arising next to nodular focus of recurrent invasive solid papillary carcinoma (7mm). No LVI/PNI. Solid papillary carcinoma/in situ component extends focally to involve lateral margin (+/+/-).\par pT1a,pNx,pMx\par \par History of right breast solid papillary carcinoma s/p WLE in 1/5/12 demonstrating encysted noninvasive papillary carcinoma in situ well differentiated, 6 mm.  Foci of DCIS intermediate nuclear grade, cribiform and micropapillary type.\par No RT - Dr. Nagel; No Tamoxifen, No med oncology, deferred by patient.  \par \par History of right breast solid papillary carcinoma (+/+/-) s/p WLE on 02/12/16 demonstrating 1.25 mm mod differentiated invasive solid papillary carcinoma, non extensive DCIS, low grade; with negative margins.  No LVI or perineural invasion, papilloma, ALH. \par Status post GUME Insertion 3/10/16; completed PBI on 3/18/16.  \par Was on AI by Dr. Hong; discontinued due to side effects.  \par \par Her family history is significant for her sister with breast cancer at 74.  \par \par \par Most recent imaging is as follows:\par 2/22/22: B/L Screening Mammo --> BIRADS 2\par -There are scattered areas of fibroglandular density.\par -There is an area of architectural distortion at the site of lumpectomy seen in the right breast.\par -There is no mammographic evidence of malignancy.\par \par \par 2/24/23: B scg mmg --> BIRADS 0\par Scattered areas of fibroglandular density\par Stable focal asymmetry in the right breast is benign finding\par Mass in the superior right breast requires additional evaluation\par \par 3/7/23: R mmg and US --> BIRADS 4\par Dense\par There is a round hyperdense mass measuring 0.8 cm in its greatest dimension is noted in superior aspect of the right breast at area of mammographic concern. This mass correlates with the sonographic finding below.\par At 12:00 N 10 there is a round hypoechoic mass measuring 1.1 x 0.8 x 0.6 cm correlating with mammographic finding above --> REC BX\par \par 3/14/23: USGB, R\par Recurrent intermediate nuclear grade solid papillary carcinoma, focally infarcted and with mucin production.\par (receptors pending)\par (minicork) (malignant concordant)\par \par 4/21/23: R WLE\par Invasive moderately differentiated ductal carcinoma, 1.2 mm, arising adjacent to a nodular focus of recurrent invasive solid papillary carcinoma, 7.0 mm.\par No lymphovascular or perineural invasion is seen.\par Invasive solid papillary carcinoma extending to focally involve (touches ink) the new surgical lateral margin.\par pT1a, pNx, pMx\par \par Patient denies any current complaints. pain controlled. Healing well. Notes a new rash in her medial breast folds/sternum.

## 2023-05-03 ENCOUNTER — NON-APPOINTMENT (OUTPATIENT)
Age: 88
End: 2023-05-03

## 2023-05-10 ENCOUNTER — RESULT REVIEW (OUTPATIENT)
Age: 88
End: 2023-05-10

## 2023-05-10 ENCOUNTER — TRANSCRIPTION ENCOUNTER (OUTPATIENT)
Age: 88
End: 2023-05-10

## 2023-05-10 ENCOUNTER — APPOINTMENT (OUTPATIENT)
Dept: BREAST CENTER | Facility: AMBULATORY SURGERY CENTER | Age: 88
End: 2023-05-10
Payer: MEDICARE

## 2023-05-10 ENCOUNTER — OUTPATIENT (OUTPATIENT)
Dept: INPATIENT UNIT | Facility: HOSPITAL | Age: 88
LOS: 1 days | Discharge: HOME | End: 2023-05-10
Payer: MEDICARE

## 2023-05-10 VITALS
HEART RATE: 60 BPM | OXYGEN SATURATION: 96 % | SYSTOLIC BLOOD PRESSURE: 125 MMHG | RESPIRATION RATE: 20 BRPM | DIASTOLIC BLOOD PRESSURE: 62 MMHG

## 2023-05-10 VITALS
OXYGEN SATURATION: 97 % | HEART RATE: 73 BPM | WEIGHT: 115.08 LBS | DIASTOLIC BLOOD PRESSURE: 70 MMHG | HEIGHT: 60 IN | TEMPERATURE: 98 F | RESPIRATION RATE: 20 BRPM | SYSTOLIC BLOOD PRESSURE: 164 MMHG

## 2023-05-10 DIAGNOSIS — Z98.890 OTHER SPECIFIED POSTPROCEDURAL STATES: Chronic | ICD-10-CM

## 2023-05-10 DIAGNOSIS — C50.919 MALIGNANT NEOPLASM OF UNSPECIFIED SITE OF UNSPECIFIED FEMALE BREAST: ICD-10-CM

## 2023-05-10 PROCEDURE — 19301 PARTIAL MASTECTOMY: CPT | Mod: RT,58

## 2023-05-10 PROCEDURE — C1889: CPT

## 2023-05-10 PROCEDURE — 88305 TISSUE EXAM BY PATHOLOGIST: CPT

## 2023-05-10 PROCEDURE — 88305 TISSUE EXAM BY PATHOLOGIST: CPT | Mod: 26

## 2023-05-10 RX ORDER — SODIUM CHLORIDE 9 MG/ML
1000 INJECTION, SOLUTION INTRAVENOUS
Refills: 0 | Status: DISCONTINUED | OUTPATIENT
Start: 2023-05-10 | End: 2023-05-10

## 2023-05-10 RX ORDER — ONDANSETRON 8 MG/1
4 TABLET, FILM COATED ORAL ONCE
Refills: 0 | Status: DISCONTINUED | OUTPATIENT
Start: 2023-05-10 | End: 2023-05-10

## 2023-05-10 RX ORDER — HYDROMORPHONE HYDROCHLORIDE 2 MG/ML
1 INJECTION INTRAMUSCULAR; INTRAVENOUS; SUBCUTANEOUS
Refills: 0 | Status: DISCONTINUED | OUTPATIENT
Start: 2023-05-10 | End: 2023-05-10

## 2023-05-10 RX ORDER — HYDROMORPHONE HYDROCHLORIDE 2 MG/ML
0.5 INJECTION INTRAMUSCULAR; INTRAVENOUS; SUBCUTANEOUS
Refills: 0 | Status: DISCONTINUED | OUTPATIENT
Start: 2023-05-10 | End: 2023-05-10

## 2023-05-10 RX ADMIN — SODIUM CHLORIDE 100 MILLILITER(S): 9 INJECTION, SOLUTION INTRAVENOUS at 15:28

## 2023-05-10 RX ADMIN — HYDROMORPHONE HYDROCHLORIDE 0.5 MILLIGRAM(S): 2 INJECTION INTRAMUSCULAR; INTRAVENOUS; SUBCUTANEOUS at 15:25

## 2023-05-10 NOTE — ASU DISCHARGE PLAN (ADULT/PEDIATRIC) - ASU DC SPECIAL INSTRUCTIONSFT
Follow Up with Dr. Mckeon as scheduled. Please call office for confirmation of your appointment.    Showers: You may resume showering in 24-48 hours. Please let the water and soap wash over your wound. Please avoid vigorous rubbing/toweling over your wound. Please gently pat your wound dry with a clean, soft cloth. Please avoid submerging your wound for 3 weeks.    Activity: Please avoid lifting anything heavier than 5 pounds with your right arm for the next 3 week.s    Surgical bra: Please wear the surgical bra as often as possible except while showering until you see Dr. Mckeon in the office.    Diet: No change.    Pain: You can take over the counter medications such as Tylenol, and Ibuprofen for pain control. Please adhere to the instructions on the back of the bottle.    If you develop fevers, chills, worsening pain, increased drainage from the wound, foul smelling drainage from the wound, nausea that won't subside, vomiting, or any other symptoms of concern, please call MD for further advice, evaluation, and/or treatment.    Activity: Ambulate and get out of bed as tolerated, and with assistance if feeling weak.

## 2023-05-10 NOTE — ASU DISCHARGE PLAN (ADULT/PEDIATRIC) - CARE PROVIDER_API CALL
Viola Mckeon (DO)  Surgery  Compr Breast  [address], [facility] [address]nd [place] [address]  Phone: [phone]  Fax: [phone]  Follow Up Time: 2 weeks

## 2023-05-10 NOTE — CHART NOTE - NSCHARTNOTEFT_GEN_A_CORE
PACU ANESTHESIA ADMISSION NOTE      Procedure: Rt breast lateRAL MARGIN REEXCISION  Post op diagnosis:      ____  Intubated  TV:______       Rate: ______      FiO2: ______    _x___  Patent Airway    _x___  Full return of protective reflexes    _x___  Full recovery from anesthesia / back to baseline status    Vitals:  T(C): 36.8 (05-10-23 @ 14:31), Max: 36.8 (05-10-23 @ 13:34)  HR: 73 (05-10-23 @ 14:31) (73 - 73)  BP: 164/70 (05-10-23 @ 14:31) (164/70 - 164/70)  RR: 20 (05-10-23 @ 14:31) (20 - 20)  SpO2: 97% (05-10-23 @ 14:31) (97% - 97%)    Mental Status:  _x___ Awake   _____ Alert   _____ Drowsy   _____ Sedated    Nausea/Vomiting:  _x___  NO       ______Yes,   See Post - Op Orders         Pain Scale (0-10):  __0___    Treatment: _x___ None    ____ See Post - Op/PCA Orders    Post - Operative Fluids:   __x__ Oral   ____ See Post - Op Orders    Plan: Discharge:   _x___Home       _____Floor     _____Critical Care    _____  Other:_________________    Comments:  No anesthesia issues or complications noted.  Discharge when criteria met.

## 2023-05-10 NOTE — BRIEF OPERATIVE NOTE - NSICDXBRIEFPOSTOP_GEN_ALL_CORE_FT
POST-OP DIAGNOSIS:  Intraductal papillary carcinoma, right 10-May-2023 14:58:42  Leonardo Laureano

## 2023-05-10 NOTE — BRIEF OPERATIVE NOTE - NSICDXBRIEFPROCEDURE_GEN_ALL_CORE_FT
PROCEDURES:  Re-excision of margin of prior procedure of breast 10-May-2023 14:58:00 Right Breast, lateral margin Leonardo Laureano

## 2023-05-10 NOTE — ASU DISCHARGE PLAN (ADULT/PEDIATRIC) - NS MD DC FALL RISK RISK
For information on Fall & Injury Prevention, visit: [email or web address] OR  [email or web address] OR  https://www.cdc.gov/steadi/patient.html

## 2023-05-10 NOTE — BRIEF OPERATIVE NOTE - OPERATION/FINDINGS
Re-Excision of Lateral Margin of Right Breast Mass   Skin and subcutaneous tissues anesthetized with bupivacaine w/ epinephrine.  3cm curved incision made over prior incision.  Seroma encountered as expected.  Lateral margin excised w/ electrocautery.  Hemostasis achieved.  Subcutaneous tissues approximated w/ buried, interrupted 3-0 Vicryl suture.  Skin closed w/ running, subcuticular 4-0 Monocryl suture.  Dermabond applied.  Padding and surgical bra applied.

## 2023-05-12 ENCOUNTER — INPATIENT (INPATIENT)
Facility: HOSPITAL | Age: 88
LOS: 3 days | Discharge: HOME | DRG: 314 | End: 2023-05-16
Attending: INTERNAL MEDICINE | Admitting: INTERNAL MEDICINE
Payer: MEDICARE

## 2023-05-12 VITALS
TEMPERATURE: 99 F | WEIGHT: 113.1 LBS | HEART RATE: 124 BPM | SYSTOLIC BLOOD PRESSURE: 127 MMHG | OXYGEN SATURATION: 99 % | DIASTOLIC BLOOD PRESSURE: 70 MMHG | HEIGHT: 60 IN | RESPIRATION RATE: 16 BRPM

## 2023-05-12 DIAGNOSIS — I24.8 OTHER FORMS OF ACUTE ISCHEMIC HEART DISEASE: ICD-10-CM

## 2023-05-12 DIAGNOSIS — K21.9 GASTRO-ESOPHAGEAL REFLUX DISEASE WITHOUT ESOPHAGITIS: ICD-10-CM

## 2023-05-12 DIAGNOSIS — E87.70 FLUID OVERLOAD, UNSPECIFIED: ICD-10-CM

## 2023-05-12 DIAGNOSIS — I25.3 ANEURYSM OF HEART: ICD-10-CM

## 2023-05-12 DIAGNOSIS — E78.5 HYPERLIPIDEMIA, UNSPECIFIED: ICD-10-CM

## 2023-05-12 DIAGNOSIS — Z79.899 OTHER LONG TERM (CURRENT) DRUG THERAPY: ICD-10-CM

## 2023-05-12 DIAGNOSIS — M81.0 AGE-RELATED OSTEOPOROSIS WITHOUT CURRENT PATHOLOGICAL FRACTURE: ICD-10-CM

## 2023-05-12 DIAGNOSIS — Z92.3 PERSONAL HISTORY OF IRRADIATION: ICD-10-CM

## 2023-05-12 DIAGNOSIS — I42.9 CARDIOMYOPATHY, UNSPECIFIED: ICD-10-CM

## 2023-05-12 DIAGNOSIS — I11.0 HYPERTENSIVE HEART DISEASE WITH HEART FAILURE: ICD-10-CM

## 2023-05-12 DIAGNOSIS — I97.191 OTHER POSTPROCEDURAL CARDIAC FUNCTIONAL DISTURBANCES FOLLOWING OTHER SURGERY: ICD-10-CM

## 2023-05-12 DIAGNOSIS — Z85.3 PERSONAL HISTORY OF MALIGNANT NEOPLASM OF BREAST: ICD-10-CM

## 2023-05-12 DIAGNOSIS — Z98.890 OTHER SPECIFIED POSTPROCEDURAL STATES: Chronic | ICD-10-CM

## 2023-05-12 DIAGNOSIS — I27.20 PULMONARY HYPERTENSION, UNSPECIFIED: ICD-10-CM

## 2023-05-12 DIAGNOSIS — Z53.29 PROCEDURE AND TREATMENT NOT CARRIED OUT BECAUSE OF PATIENT'S DECISION FOR OTHER REASONS: ICD-10-CM

## 2023-05-12 DIAGNOSIS — M79.7 FIBROMYALGIA: ICD-10-CM

## 2023-05-12 DIAGNOSIS — I50.31 ACUTE DIASTOLIC (CONGESTIVE) HEART FAILURE: ICD-10-CM

## 2023-05-12 DIAGNOSIS — I48.92 UNSPECIFIED ATRIAL FLUTTER: ICD-10-CM

## 2023-05-12 LAB
ALBUMIN SERPL ELPH-MCNC: 3.7 G/DL — SIGNIFICANT CHANGE UP (ref 3.5–5.2)
ALP SERPL-CCNC: 36 U/L — SIGNIFICANT CHANGE UP (ref 30–115)
ALT FLD-CCNC: 9 U/L — SIGNIFICANT CHANGE UP (ref 0–41)
ANION GAP SERPL CALC-SCNC: 9 MMOL/L — SIGNIFICANT CHANGE UP (ref 7–14)
AST SERPL-CCNC: 29 U/L — SIGNIFICANT CHANGE UP (ref 0–41)
BASOPHILS # BLD AUTO: 0.02 K/UL — SIGNIFICANT CHANGE UP (ref 0–0.2)
BASOPHILS NFR BLD AUTO: 0.3 % — SIGNIFICANT CHANGE UP (ref 0–1)
BILIRUB SERPL-MCNC: 0.5 MG/DL — SIGNIFICANT CHANGE UP (ref 0.2–1.2)
BUN SERPL-MCNC: 14 MG/DL — SIGNIFICANT CHANGE UP (ref 10–20)
CALCIUM SERPL-MCNC: 8.4 MG/DL — SIGNIFICANT CHANGE UP (ref 8.4–10.5)
CHLORIDE SERPL-SCNC: 96 MMOL/L — LOW (ref 98–110)
CO2 SERPL-SCNC: 25 MMOL/L — SIGNIFICANT CHANGE UP (ref 17–32)
CREAT SERPL-MCNC: 0.7 MG/DL — SIGNIFICANT CHANGE UP (ref 0.7–1.5)
D DIMER BLD IA.RAPID-MCNC: <150 NG/ML DDU — SIGNIFICANT CHANGE UP
EGFR: 84 ML/MIN/1.73M2 — SIGNIFICANT CHANGE UP
EOSINOPHIL # BLD AUTO: 0.01 K/UL — SIGNIFICANT CHANGE UP (ref 0–0.7)
EOSINOPHIL NFR BLD AUTO: 0.1 % — SIGNIFICANT CHANGE UP (ref 0–8)
GLUCOSE SERPL-MCNC: 89 MG/DL — SIGNIFICANT CHANGE UP (ref 70–99)
HCT VFR BLD CALC: 38.4 % — SIGNIFICANT CHANGE UP (ref 37–47)
HGB BLD-MCNC: 13.1 G/DL — SIGNIFICANT CHANGE UP (ref 12–16)
IMM GRANULOCYTES NFR BLD AUTO: 0.1 % — SIGNIFICANT CHANGE UP (ref 0.1–0.3)
LYMPHOCYTES # BLD AUTO: 1.31 K/UL — SIGNIFICANT CHANGE UP (ref 1.2–3.4)
LYMPHOCYTES # BLD AUTO: 18.8 % — LOW (ref 20.5–51.1)
MCHC RBC-ENTMCNC: 30.5 PG — SIGNIFICANT CHANGE UP (ref 27–31)
MCHC RBC-ENTMCNC: 34.1 G/DL — SIGNIFICANT CHANGE UP (ref 32–37)
MCV RBC AUTO: 89.5 FL — SIGNIFICANT CHANGE UP (ref 81–99)
MONOCYTES # BLD AUTO: 0.78 K/UL — HIGH (ref 0.1–0.6)
MONOCYTES NFR BLD AUTO: 11.2 % — HIGH (ref 1.7–9.3)
NEUTROPHILS # BLD AUTO: 4.84 K/UL — SIGNIFICANT CHANGE UP (ref 1.4–6.5)
NEUTROPHILS NFR BLD AUTO: 69.5 % — SIGNIFICANT CHANGE UP (ref 42.2–75.2)
NRBC # BLD: 0 /100 WBCS — SIGNIFICANT CHANGE UP (ref 0–0)
NT-PROBNP SERPL-SCNC: 3718 PG/ML — HIGH (ref 0–300)
PLATELET # BLD AUTO: 171 K/UL — SIGNIFICANT CHANGE UP (ref 130–400)
PMV BLD: 10.6 FL — HIGH (ref 7.4–10.4)
POTASSIUM SERPL-MCNC: 4.3 MMOL/L — SIGNIFICANT CHANGE UP (ref 3.5–5)
POTASSIUM SERPL-SCNC: 4.3 MMOL/L — SIGNIFICANT CHANGE UP (ref 3.5–5)
PROT SERPL-MCNC: 6.1 G/DL — SIGNIFICANT CHANGE UP (ref 6–8)
RBC # BLD: 4.29 M/UL — SIGNIFICANT CHANGE UP (ref 4.2–5.4)
RBC # FLD: 12.6 % — SIGNIFICANT CHANGE UP (ref 11.5–14.5)
SODIUM SERPL-SCNC: 130 MMOL/L — LOW (ref 135–146)
WBC # BLD: 6.97 K/UL — SIGNIFICANT CHANGE UP (ref 4.8–10.8)
WBC # FLD AUTO: 6.97 K/UL — SIGNIFICANT CHANGE UP (ref 4.8–10.8)

## 2023-05-12 PROCEDURE — 99285 EMERGENCY DEPT VISIT HI MDM: CPT | Mod: GC

## 2023-05-12 PROCEDURE — 93010 ELECTROCARDIOGRAM REPORT: CPT

## 2023-05-12 PROCEDURE — 71045 X-RAY EXAM CHEST 1 VIEW: CPT | Mod: 26

## 2023-05-12 RX ORDER — SODIUM CHLORIDE 9 MG/ML
500 INJECTION, SOLUTION INTRAVENOUS ONCE
Refills: 0 | Status: COMPLETED | OUTPATIENT
Start: 2023-05-12 | End: 2023-05-12

## 2023-05-12 RX ADMIN — SODIUM CHLORIDE 500 MILLILITER(S): 9 INJECTION, SOLUTION INTRAVENOUS at 22:18

## 2023-05-12 NOTE — ASU DISCHARGE PLAN (ADULT/PEDIATRIC) - PATIENT EDUCATION MATERIALS PROVIED
Lethargy/low blood pressure as per son x 1 week. Son says pt did not eat much today. Walk in pain management

## 2023-05-12 NOTE — ED ADULT TRIAGE NOTE - CHIEF COMPLAINT QUOTE
Presents from home post R lumpectomy 2 days prior and here for high pulse at home in the 120s at 2pm today.

## 2023-05-13 DIAGNOSIS — I48.92 UNSPECIFIED ATRIAL FLUTTER: ICD-10-CM

## 2023-05-13 LAB
ANION GAP SERPL CALC-SCNC: 10 MMOL/L — SIGNIFICANT CHANGE UP (ref 7–14)
ANION GAP SERPL CALC-SCNC: 12 MMOL/L — SIGNIFICANT CHANGE UP (ref 7–14)
APTT BLD: 40.6 SEC — HIGH (ref 27–39.2)
APTT BLD: 41.8 SEC — HIGH (ref 27–39.2)
BUN SERPL-MCNC: 10 MG/DL — SIGNIFICANT CHANGE UP (ref 10–20)
BUN SERPL-MCNC: 14 MG/DL — SIGNIFICANT CHANGE UP (ref 10–20)
CALCIUM SERPL-MCNC: 8.6 MG/DL — SIGNIFICANT CHANGE UP (ref 8.4–10.5)
CALCIUM SERPL-MCNC: 8.8 MG/DL — SIGNIFICANT CHANGE UP (ref 8.4–10.5)
CHLORIDE SERPL-SCNC: 101 MMOL/L — SIGNIFICANT CHANGE UP (ref 98–110)
CHLORIDE SERPL-SCNC: 98 MMOL/L — SIGNIFICANT CHANGE UP (ref 98–110)
CO2 SERPL-SCNC: 25 MMOL/L — SIGNIFICANT CHANGE UP (ref 17–32)
CO2 SERPL-SCNC: 29 MMOL/L — SIGNIFICANT CHANGE UP (ref 17–32)
CREAT SERPL-MCNC: 0.7 MG/DL — SIGNIFICANT CHANGE UP (ref 0.7–1.5)
CREAT SERPL-MCNC: 0.7 MG/DL — SIGNIFICANT CHANGE UP (ref 0.7–1.5)
EGFR: 84 ML/MIN/1.73M2 — SIGNIFICANT CHANGE UP
EGFR: 84 ML/MIN/1.73M2 — SIGNIFICANT CHANGE UP
GLUCOSE SERPL-MCNC: 103 MG/DL — HIGH (ref 70–99)
GLUCOSE SERPL-MCNC: 96 MG/DL — SIGNIFICANT CHANGE UP (ref 70–99)
HCT VFR BLD CALC: 44.2 % — SIGNIFICANT CHANGE UP (ref 37–47)
HGB BLD-MCNC: 14.5 G/DL — SIGNIFICANT CHANGE UP (ref 12–16)
LACTATE SERPL-SCNC: 1.7 MMOL/L — SIGNIFICANT CHANGE UP (ref 0.7–2)
MAGNESIUM SERPL-MCNC: 1.9 MG/DL — SIGNIFICANT CHANGE UP (ref 1.8–2.4)
MAGNESIUM SERPL-MCNC: 2 MG/DL — SIGNIFICANT CHANGE UP (ref 1.8–2.4)
MCHC RBC-ENTMCNC: 29.5 PG — SIGNIFICANT CHANGE UP (ref 27–31)
MCHC RBC-ENTMCNC: 32.8 G/DL — SIGNIFICANT CHANGE UP (ref 32–37)
MCV RBC AUTO: 89.8 FL — SIGNIFICANT CHANGE UP (ref 81–99)
NRBC # BLD: 0 /100 WBCS — SIGNIFICANT CHANGE UP (ref 0–0)
PLATELET # BLD AUTO: 201 K/UL — SIGNIFICANT CHANGE UP (ref 130–400)
PMV BLD: 11.2 FL — HIGH (ref 7.4–10.4)
POTASSIUM SERPL-MCNC: 3.4 MMOL/L — LOW (ref 3.5–5)
POTASSIUM SERPL-MCNC: 4.1 MMOL/L — SIGNIFICANT CHANGE UP (ref 3.5–5)
POTASSIUM SERPL-SCNC: 3.4 MMOL/L — LOW (ref 3.5–5)
POTASSIUM SERPL-SCNC: 4.1 MMOL/L — SIGNIFICANT CHANGE UP (ref 3.5–5)
RBC # BLD: 4.92 M/UL — SIGNIFICANT CHANGE UP (ref 4.2–5.4)
RBC # FLD: 12.6 % — SIGNIFICANT CHANGE UP (ref 11.5–14.5)
SODIUM SERPL-SCNC: 137 MMOL/L — SIGNIFICANT CHANGE UP (ref 135–146)
SODIUM SERPL-SCNC: 138 MMOL/L — SIGNIFICANT CHANGE UP (ref 135–146)
TROPONIN T SERPL-MCNC: 0.15 NG/ML — CRITICAL HIGH
TROPONIN T SERPL-MCNC: 0.16 NG/ML — CRITICAL HIGH
TROPONIN T SERPL-MCNC: 0.18 NG/ML — CRITICAL HIGH
TSH SERPL-MCNC: 3.15 UIU/ML — SIGNIFICANT CHANGE UP (ref 0.27–4.2)
WBC # BLD: 7.17 K/UL — SIGNIFICANT CHANGE UP (ref 4.8–10.8)
WBC # FLD AUTO: 7.17 K/UL — SIGNIFICANT CHANGE UP (ref 4.8–10.8)

## 2023-05-13 PROCEDURE — 36415 COLL VENOUS BLD VENIPUNCTURE: CPT

## 2023-05-13 PROCEDURE — 97110 THERAPEUTIC EXERCISES: CPT | Mod: GP

## 2023-05-13 PROCEDURE — 84443 ASSAY THYROID STIM HORMONE: CPT

## 2023-05-13 PROCEDURE — 93306 TTE W/DOPPLER COMPLETE: CPT | Mod: 26

## 2023-05-13 PROCEDURE — 80048 BASIC METABOLIC PNL TOTAL CA: CPT

## 2023-05-13 PROCEDURE — 83735 ASSAY OF MAGNESIUM: CPT

## 2023-05-13 PROCEDURE — 84484 ASSAY OF TROPONIN QUANT: CPT

## 2023-05-13 PROCEDURE — 93306 TTE W/DOPPLER COMPLETE: CPT

## 2023-05-13 PROCEDURE — 85730 THROMBOPLASTIN TIME PARTIAL: CPT

## 2023-05-13 PROCEDURE — 83605 ASSAY OF LACTIC ACID: CPT

## 2023-05-13 PROCEDURE — 71275 CT ANGIOGRAPHY CHEST: CPT | Mod: 26,MA

## 2023-05-13 PROCEDURE — 99223 1ST HOSP IP/OBS HIGH 75: CPT

## 2023-05-13 PROCEDURE — 85027 COMPLETE CBC AUTOMATED: CPT

## 2023-05-13 RX ORDER — POTASSIUM CHLORIDE 20 MEQ
40 PACKET (EA) ORAL EVERY 4 HOURS
Refills: 0 | Status: COMPLETED | OUTPATIENT
Start: 2023-05-13 | End: 2023-05-13

## 2023-05-13 RX ORDER — HEPARIN SODIUM 5000 [USP'U]/ML
2000 INJECTION INTRAVENOUS; SUBCUTANEOUS EVERY 6 HOURS
Refills: 0 | Status: DISCONTINUED | OUTPATIENT
Start: 2023-05-13 | End: 2023-05-13

## 2023-05-13 RX ORDER — ENOXAPARIN SODIUM 100 MG/ML
50 INJECTION SUBCUTANEOUS EVERY 24 HOURS
Refills: 0 | Status: DISCONTINUED | OUTPATIENT
Start: 2023-05-13 | End: 2023-05-13

## 2023-05-13 RX ORDER — FUROSEMIDE 40 MG
40 TABLET ORAL ONCE
Refills: 0 | Status: COMPLETED | OUTPATIENT
Start: 2023-05-13 | End: 2023-05-13

## 2023-05-13 RX ORDER — ENOXAPARIN SODIUM 100 MG/ML
30 INJECTION SUBCUTANEOUS EVERY 12 HOURS
Refills: 0 | Status: DISCONTINUED | OUTPATIENT
Start: 2023-05-13 | End: 2023-05-13

## 2023-05-13 RX ORDER — ATORVASTATIN CALCIUM 80 MG/1
40 TABLET, FILM COATED ORAL AT BEDTIME
Refills: 0 | Status: DISCONTINUED | OUTPATIENT
Start: 2023-05-13 | End: 2023-05-16

## 2023-05-13 RX ORDER — ACETAMINOPHEN 500 MG
650 TABLET ORAL EVERY 6 HOURS
Refills: 0 | Status: DISCONTINUED | OUTPATIENT
Start: 2023-05-13 | End: 2023-05-16

## 2023-05-13 RX ORDER — METOPROLOL TARTRATE 50 MG
25 TABLET ORAL ONCE
Refills: 0 | Status: DISCONTINUED | OUTPATIENT
Start: 2023-05-13 | End: 2023-05-13

## 2023-05-13 RX ORDER — ENOXAPARIN SODIUM 100 MG/ML
50 INJECTION SUBCUTANEOUS EVERY 12 HOURS
Refills: 0 | Status: DISCONTINUED | OUTPATIENT
Start: 2023-05-13 | End: 2023-05-14

## 2023-05-13 RX ORDER — METOPROLOL TARTRATE 50 MG
25 TABLET ORAL ONCE
Refills: 0 | Status: COMPLETED | OUTPATIENT
Start: 2023-05-13 | End: 2023-05-13

## 2023-05-13 RX ORDER — METOPROLOL TARTRATE 50 MG
25 TABLET ORAL THREE TIMES A DAY
Refills: 0 | Status: DISCONTINUED | OUTPATIENT
Start: 2023-05-13 | End: 2023-05-13

## 2023-05-13 RX ORDER — FUROSEMIDE 40 MG
40 TABLET ORAL
Refills: 0 | Status: DISCONTINUED | OUTPATIENT
Start: 2023-05-13 | End: 2023-05-16

## 2023-05-13 RX ORDER — METOPROLOL TARTRATE 50 MG
5 TABLET ORAL ONCE
Refills: 0 | Status: COMPLETED | OUTPATIENT
Start: 2023-05-13 | End: 2023-05-13

## 2023-05-13 RX ORDER — HEPARIN SODIUM 5000 [USP'U]/ML
INJECTION INTRAVENOUS; SUBCUTANEOUS
Qty: 25000 | Refills: 0 | Status: DISCONTINUED | OUTPATIENT
Start: 2023-05-13 | End: 2023-05-13

## 2023-05-13 RX ORDER — METOPROLOL TARTRATE 50 MG
50 TABLET ORAL THREE TIMES A DAY
Refills: 0 | Status: DISCONTINUED | OUTPATIENT
Start: 2023-05-13 | End: 2023-05-14

## 2023-05-13 RX ORDER — ACETAMINOPHEN 500 MG
650 TABLET ORAL ONCE
Refills: 0 | Status: COMPLETED | OUTPATIENT
Start: 2023-05-13 | End: 2023-05-13

## 2023-05-13 RX ORDER — HEPARIN SODIUM 5000 [USP'U]/ML
4000 INJECTION INTRAVENOUS; SUBCUTANEOUS EVERY 6 HOURS
Refills: 0 | Status: DISCONTINUED | OUTPATIENT
Start: 2023-05-13 | End: 2023-05-13

## 2023-05-13 RX ADMIN — Medication 5 MILLIGRAM(S): at 04:18

## 2023-05-13 RX ADMIN — Medication 25 MILLIGRAM(S): at 05:08

## 2023-05-13 RX ADMIN — Medication 650 MILLIGRAM(S): at 16:07

## 2023-05-13 RX ADMIN — Medication 40 MILLIGRAM(S): at 05:08

## 2023-05-13 RX ADMIN — Medication 50 MILLIGRAM(S): at 15:07

## 2023-05-13 RX ADMIN — Medication 650 MILLIGRAM(S): at 15:07

## 2023-05-13 RX ADMIN — Medication 40 MILLIEQUIVALENT(S): at 15:07

## 2023-05-13 RX ADMIN — ATORVASTATIN CALCIUM 40 MILLIGRAM(S): 80 TABLET, FILM COATED ORAL at 22:32

## 2023-05-13 RX ADMIN — HEPARIN SODIUM 1000 UNIT(S)/HR: 5000 INJECTION INTRAVENOUS; SUBCUTANEOUS at 05:08

## 2023-05-13 RX ADMIN — Medication 40 MILLIEQUIVALENT(S): at 10:00

## 2023-05-13 RX ADMIN — ENOXAPARIN SODIUM 50 MILLIGRAM(S): 100 INJECTION SUBCUTANEOUS at 11:46

## 2023-05-13 RX ADMIN — Medication 25 MILLIGRAM(S): at 10:28

## 2023-05-13 RX ADMIN — Medication 50 MILLIGRAM(S): at 22:32

## 2023-05-13 RX ADMIN — Medication 40 MILLIGRAM(S): at 15:06

## 2023-05-13 NOTE — CONSULT NOTE ADULT - SUBJECTIVE AND OBJECTIVE BOX
GENERAL SURGERY CONSULT NOTE    Patient: SARANYA RALPH , 88y ([birth year])Female   MRN: [id number]  Location: [facility] [place] ED  Visit: 05-12-23 Emergency  Date: 05-13-23 @ 00:11    HPI:  88F with pmh of HTN, OP, R breast papillary carcinoma s/p lumpectomy in 2012 with recurrence of R breast papillary carcinoma ER/FL+ w/ additional DCIs in 2016 s/p R breast lumpectomy with adjuvant radiation and hormonal therapy, complicated by another recurrence s/p wide local excision 4/21/23 and re-excision of lateral margin on 5/10 presents with tachycardia noted at home. Patient states she was feeling lightheaded at home and almost passed out, HR was noted to be tachycardic on home monitor and PCP suggested she come to the ED. Upon arrival, tachy to 133, BP normotensive, afebrile. Denies nausea/vomiting, diarrhea/constipation, chest pain, dizziness, vision changes, headache, weakness, paresthesias, dysuria. On examination R breast incision healing well, no fluctuance/drainage, no palpable hematoma. EKG revealing A flutter 2:1.    PAST MEDICAL & SURGICAL HISTORY:  Hypertension      Hyperlipidemia      GERD (gastroesophageal reflux disease)      Osteoporosis      S/P radiation therapy      H/O fibromyalgia      S/P lumpectomy, right breast          Home Medications:  Amlodipine 4mg QHS:  (21 Apr 2023 11:31)  Biotin 500mg daily:  (21 Apr 2023 11:31)  Caltrate 2 tbs daily:  (21 Apr 2023 11:31)  Crestor 5 mg oral tablet: 1 tab(s) orally once a day (at bedtime) (21 Apr 2023 11:31)  Entersto 24-26mg 2 times /day:  (21 Apr 2023 11:31)  LORazepam 0.5 mg oral tablet:  (21 Apr 2023 11:31)  Omeprazole 20mg I cap daily:  (21 Apr 2023 11:31)  Prolia injection every six months:  (21 Apr 2023 11:31)  Toprol-XL 25 mg oral tablet, extended release: 1 tab(s) orally once a day (21 Apr 2023 11:31)  Vitamin C 1000mg daily:  (21 Apr 2023 11:31)        VITALS:  T(F): 98.4 (05-12-23 @ 23:29), Max: 98.7 (05-12-23 @ 20:13)  HR: 124 (05-12-23 @ 23:29) (124 - 124)  BP: 127/72 (05-12-23 @ 23:29) (127/70 - 127/72)  RR: 16 (05-12-23 @ 23:29) (16 - 16)  SpO2: 99% (05-12-23 @ 23:29) (99% - 99%)    PHYSICAL EXAM:  General: NAD, AAOx3, calm and cooperative  HEENT: NCAT, NISSA, EOMI, Trachea ML, Neck supple  Breast: R breast without palpable hematoma, no fluctuance, no nipple discharge, no erythema, no drainage, no palpable mass or lymphadenopathy bilaterally  Cardiac: tachycardic, S1, S2  Respiratory: CTAB, normal respiratory effort  Abdomen: Soft, non-distended, non-tender    MEDICATIONS  (STANDING):    MEDICATIONS  (PRN):      LAB/STUDIES:                        13.1   6.97  )-----------( 171      ( 12 May 2023 22:21 )             38.4     05-12    130<L>  |  96<L>  |  14  ----------------------------<  89  4.3   |  25  |  0.7    Ca    8.4      12 May 2023 22:21    TPro  6.1  /  Alb  3.7  /  TBili  0.5  /  DBili  x   /  AST  29  /  ALT  9   /  AlkPhos  36  05-12      LIVER FUNCTIONS - ( 12 May 2023 22:21 )  Alb: 3.7 g/dL / Pro: 6.1 g/dL / ALK PHOS: 36 U/L / ALT: 9 U/L / AST: 29 U/L / GGT: x                         IMAGING:      ACCESS DEVICES:  [ ] Peripheral IV

## 2023-05-13 NOTE — H&P ADULT - NSHPREVIEWOFSYSTEMS_GEN_ALL_CORE
REVIEW OF SYSTEMS:    CONSTITUTIONAL: No weakness, fevers or chills  EYES/ENT: No visual changes;  No vertigo or throat pain   NECK: No pain or stiffness  RESPIRATORY: No cough, wheezing, hemoptysis; No shortness of breath  CARDIOVASCULAR: No chest pain or palpitations  GASTROINTESTINAL: No abdominal or epigastric pain. No nausea, vomiting, or hematemesis; No diarrhea or constipation. No melena or hematochezia.  GENITOURINARY: No dysuria, frequency or hematuria  NEUROLOGICAL: No numbness or weakness  SKIN: No itching, burning, rashes, or lesions   PSYCH: No Depression, no anxiety  All other review of systems is negative unless indicated above.

## 2023-05-13 NOTE — H&P ADULT - NS ATTEND AMEND GEN_ALL_CORE FT
I agree with the assessment and plan above, in addition to further documentation below.    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 She recently underwent mastectomy. Suspect that her surgery and volume overload triggered her AFL. CTA was negative for PE.   -Continue rate control with metoprolol, increase to 50 q8hr  -Switch to heparin to lovenox, pending  -Continue diuresis today, can likely switch to PO tomorrow  -Will discuss with EP re: possible ablation  -Send TSH  -TTE pending

## 2023-05-13 NOTE — ED PROVIDER NOTE - CLINICAL SUMMARY MEDICAL DECISION MAKING FREE TEXT BOX
Pt presented for evaluation of palpations. Pt was found to have new onset a flutter. CTA chest neg. Plan is admission for further management

## 2023-05-13 NOTE — ED PROVIDER NOTE - PROGRESS NOTE DETAILS
pk: will place in cardio tele. pk: ekg reviewed, aflutter appreciated, will obtain labs CT chest to rule out PE and surgery team will evaluate the patient at the bedside. pk: labs show troponin of 0.16, CT demonstrates no PE. cardio tele consult placed.

## 2023-05-13 NOTE — H&P ADULT - ASSESSMENT
Aflutter with RVR, currently hemodynamically stable  Troponin 0.1 without ACS signs, likely 2/2 demand ischemia  Cardiomyopathy, fluid overloaded on exam and US    Aflutter  - Patient likely fluid overloaded and thus triggering aflutter with demand ischemia present.  - Surgical wound assessed per surgery and appears benign. Discussed with surgery on call team and no contraindication to initiate AC.  - Attempt rate control aflutter with metoprolol 25mg TID.   - AC heparin gtt given elevated CHADSVASC score.  - Check TSH.  - Check TTE.  - Monitor on tele.  - If not controlled rate, will likely benefit from BONNIE/CV early next week.    Fluid overload  - CT chest reveals bilateral pulm edema. US reveals non-collapsing IVC.  - Diurese with 40mg lasix IVP x 1.   - Strict I&Os monitoring.  - Serial IVC US.    Troponin elevation  - Likely related to demand ischemia with underlying CAD?  - Check TTE for LV functions.  - Monitor on tele.  - C/w BB  and statin.     Aflutter with RVR, currently hemodynamically stable  Troponin 0.1 without ACS signs, likely 2/2 demand ischemia  Cardiomyopathy, fluid overloaded on exam and US    Aflutter  - Patient likely fluid overloaded and thus triggering aflutter with demand ischemia present.  - Surgical wound assessed per surgery and appears benign. Discussed with surgery on call team and no contraindication to initiate AC.  - Attempt rate control aflutter with metoprolol 25mg TID.   - AC heparin gtt given elevated CHADSVASC score.  - Check TSH.  - Check TTE.  - Monitor on tele.  - If not controlled rate, will likely benefit from BONNIE/CV early next week.    Fluid overload  - CT chest reveals bilateral pulm edema. US reveals non-collapsing IVC.  - Diurese with 40mg lasix IVP x 1.   - Strict I&Os monitoring.  - Serial IVC US.    Troponin elevation  - Likely related to demand ischemia with underlying CAD?  - Check TTE for LV functions.  - Monitor on tele.  - C/w BB  and statin.  - Will need to obtain record from outpatient cardiologist.

## 2023-05-13 NOTE — ED ADULT NURSE NOTE - NS PRO PASSIVE SMOKE EXP
Subjective:  HPI                    Objective:  System    Physical Exam    General     ROS    Assessment/Plan:    PROGRESS  REPORT    Progress reporting period is from 5/10/18 to 7/18/18.     SUBJECTIVE  Subjective: pt feels the same as last week, pt reports improved LBP with walking 3-4 blocks    Current Pain level: 5/10   Initial Pain level: 8/10   Changes in function: Yes, see goal flow sheet for change in function   Adverse reactions: None;   ,     The subjective and objective information are from the last SOAP note on this patient.    OBJECTIVE  Objective: Improved pain post PT session today pt reports fatigue with 3-5 reps of hip SLR exercises in a standing position, pt able to bend forward more easily, able to transfer in and out of bed more easily without pain; pt would like to be DC from PT for now and continue back in fall      ASSESSMENT/PLAN  Updated problem list and treatment plan: Diagnosis 1:  Low back pain   STG/LTGs have been met or progress has been made towards goals:  Yes (See Goal flow sheet completed today.)  Assessment of Progress: The patient's condition is improving.  The patient's condition has potential to improve.  Self Management Plans:  Patient has been instructed in a home treatment program.  Patient is independent in a home treatment program.  I have re-evaluated this patient and find that the nature, scope, duration and intensity of the therapy is appropriate for the medical condition of the patient.  Aide continues to require the following intervention to meet STG and LTG's: PT  We will discharge this patient from PT.    Recommendations:  This patient is ready to be discharged from therapy and continue their home treatment program.    Please refer to the daily flowsheet for treatment today, total treatment time and time spent performing 1:1 timed codes.       Unknown

## 2023-05-13 NOTE — PATIENT PROFILE ADULT - FALL HARM RISK - HARM RISK INTERVENTIONS

## 2023-05-13 NOTE — CONSULT NOTE ADULT - ASSESSMENT
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 Patient states she was feeling lightheaded at home and almost passed out, HR was noted to be tachycardic on home monitor and PCP suggested she come to the ED. Upon arrival, tachy to 133, BP normotensive, afebrile. Denies nausea/vomiting, diarrhea/constipation, chest pain, dizziness, vision changes, headache, weakness, paresthesias, dysuria. On examination R breast incision healing well, no fluctuance/drainage, no palpable hematoma.    PLAN:  -No acute surgical intervention  -No evidence of bleeding or infection at surgical site  -Check CBC  -EKG with A flutter 2:1, no hx of arrythmia  -Check CXR  -f/u CT PE per ED    4901      Discussed with Dr. Mckeon    CONSULT SPECTRA: [id number]   ASSESSMENT:  88F with pmh of HTN, OP, R breast papillary carcinoma s/p lumpectomy in 2012 with recurrence of R breast papillary carcinoma ER/OK+ w/ additional DCIs in 2016 s/p R breast lumpectomy with adjuvant radiation and hormonal therapy, complicated by another recurrence s/p wide local excision 4/21/23 and re-excision of lateral margin on 5/10 presents with tachycardia noted at home. Patient states she was feeling lightheaded at home and almost passed out, HR was noted to be tachycardic on home monitor and PCP suggested she come to the ED. Upon arrival, tachy to 133, BP normotensive, afebrile. Denies nausea/vomiting, diarrhea/constipation, chest pain, dizziness, vision changes, headache, weakness, paresthesias, dysuria. On examination R breast incision healing well, no fluctuance/drainage, no palpable hematoma.    PLAN:  -No acute surgical intervention  -No evidence of bleeding or infection at surgical site  -No contraindication to AC   -Check CBC  -EKG with A flutter 2:1, no hx of arrythmia  -Check CXR  -f/u CT PE per ED    7575      Discussed with Dr. Mckeon    CONSULT SPECTRA: [id number]

## 2023-05-13 NOTE — PATIENT PROFILE ADULT - PUBLIC BENEFITS
Daily Progress Note: 10/29/2021  Berta Terrazas NP    Assessment/Plan:   Humeral surgical neck fracture (10/18/2021) POA: CT upper extremity showed a comminuted fracture involving the humeral neck and the greater tuberosity.   -Seen by orthopedic surgery who deem her stable. -Iinitially non operative management recommended but now s/p repair yesterday  -Continue an arm sling and NWB RUE, pain control. PT, OT.   -CM for ultimate discharge planning.   -Accepted to [facility] pending bed availability, later today or tomorrow     Closed fracture of multiple pubic rami ([facility] [address] (10/18/2021) POA: CT pelvis showed an acute nondisplaced right superior and inferior pubic rami fractures. Proximal right femoral intramedullary hardware is intact. -Reviewed by ortho. -Continue WBAT with walker.   -PT, OT following.      Right knee pain POA: has associated swelling.   -Xray right knee (moderate joint effusion but no fx) as well as a venous doppler ultrasound (negative for DVT)  -Pain control     HTN (hypertension), benign (10/18/2021) POA: BP stable. -Continue Amlodipine     DM (diabetes mellitus), type 2 ([facility] [address] (10/18/2021) POA: A1c 6.1.   -Restarted SSI  -DM diet as tolerated. -Home Amaryl on hold.      Leukocytosis (10/18/2021) POA: CXR clear. UA not impressive. WBCs better. Afebrile.   -resolved     CKD (chronic kidney disease) stage 4 (HCC) (10/18/2021) / Hypokalemia due to loss of potassium (10/18/2021) POA:   -K+ better. -SCr slightly high and likely has stage 4 disease now  -Continue gentle hydration.    -ARF POA, improving with hydration, baseline creatinine unclear       Problem List:  Problem List as of 10/29/2021 Date Reviewed: 10/18/2021        Codes Class Noted - Resolved    Right knee pain ICD-10-CM: M25.561  ICD-9-CM: 719.46  10/21/2021 - Present        * (Principal) Humeral surgical neck fracture ICD-10-CM: Q29.222L  ICD-9-CM: 812.01  10/18/2021 - Present        Closed fracture of multiple pubic rami [facility]) ICD-10-CM: U69.775B  ICD-9-CM: 808.2  10/18/2021 - Present        HTN (hypertension), benign ICD-10-CM: I10  ICD-9-CM: 401.1  10/18/2021 - Present        DM (diabetes mellitus), type 2 ([address] ICD-10-CM: E11.9  ICD-9-CM: 250.00  10/18/2021 - Present        Leukocytosis ICD-10-CM: D54.808  ICD-9-CM: 288.60  10/18/2021 - Present        CKD (chronic kidney disease) stage 3, GFR 30-59 ml/min ([facility]) ICD-10-CM: N18.30  ICD-9-CM: 585.3  10/18/2021 - Present        Hypokalemia due to loss of potassium ICD-10-CM: E87.6  ICD-9-CM: 276.8  10/18/2021 - Present              HPI:   Ms. Felisa Brito is an 88y.o.  female with PMH of DM, CKD admitted s/p fall for eval. Noted to have femoral neck fracture and pubic rami fracture. Pt's only complaint is RUE pain that is currently controlled with Tylenol. Lives with her daughter. Typically walks with a walker. (Dr Hans Villarreal)     10/22: Didn't sleep well last night. Xray of knee is ok except for an effusion. US neg for DVT. Cr 2.35. Pain is controlled. Surgery scheduled for Monday. 10/23:  Doing well. Pain moderate level. Plan is for surgery Monday. 10/24: No complaints this am. BS are better with SSI. Holding home meds. AM labs pending. 10/25: Surgery planned for later today. AM labs stable, creatinine continues to slowly improve. She denies pain at this time, is looking forward to getting her shoulder repaired. She understands the pelvic fractures cannot be surgically repaired and will take weeks/months to heal.    10/26: Tolerated surgery well, did not sleep well last night. Pain is controlled right now. Will have to wait and see how she does with therapy over the next few days to decide about disposition. 10/27: Worked with PT/OT yesterday, needs further rehab but she is encouraged. She has had some shoulder pain but it is manageable with just Tylenol right now. 10/28: Worked with PT again yesterday, needs a lot of assistance.   ABEL working on transition to rehab. Pain is well controlled. 10/29: Accepted to [facility], awaiting bed availability. Doing well, working with therapy, pain controlled. Discussed with patient's daughter, requested we resume her Lasix due to swelling. Review of Systems:   A comprehensive review of systems was negative except for that written in the HPI. Objective:   Physical Exam:     Visit Vitals  BP (!) 154/67 (BP 1 Location: Left lower arm, BP Patient Position: At rest)   Pulse 90   Temp 98.1 °F (36.7 °C)   Resp 18   Ht 5' 2.01\" (1.575 m)   Wt 63.5 kg (140 lb)   SpO2 94%   BMI 25.60 kg/m²    O2 Flow Rate (L/min): 2 l/min O2 Device: None (Room air)    Temp (24hrs), Av °F (36.7 °C), Min:97.7 °F (36.5 °C), Max:98.5 °F (36.9 °C)    10/28 1901 - 10/29 0700  In: 240 [P.O.:240]  Out: 550 [Urine:550]   10/27 07 - 10/28 1900  In: 720 [P.O.:720]  Out: 3025 [Urine:3025]    General:  Alert, cooperative, no distress, appears stated age. Head:  Normocephalic, without obvious abnormality, atraumatic. Eyes:  Conjunctivae/corneas clear. PERRL, EOMs intact. Nose: Nares normal. Septum midline. Mucosa normal. No drainage or sinus tenderness. Throat: Lips, mucosa, and tongue normal.   Neck: Supple, symmetrical, trachea midline, no adenopathy, thyroid: no enlargement/tenderness/nodules, no carotid bruit and no JVD. Back:   Symmetric, no curvature. ROM normal. No CVA tenderness. Lungs:   Clear to auscultation bilaterally. Chest wall:  No tenderness or deformity. Heart:  Regular rate and rhythm, S1, S2 normal, no murmur, click, rub or gallop. Abdomen:   Soft, non-tender. Bowel sounds normal. No masses,  No organomegaly. Extremities: Right UE in sling,  no cyanosis or edema. No calf tenderness or cords. Pulses: 2+ and symmetric all extremities. Skin: Skin color, texture, turgor normal. No rashes or lesions   Neurologic: CNII-XII intact. Alert and oriented X 3.   Fine motor of hands and fingers normal.  equal.  No cogwheeling or rigidity. Gait not tested at this time. Sensation grossly normal to touch. Gross motor of extremities normal.       Data Review:   X-ray Right knee 10/21/21    IMPRESSION  1. No definite evidence of acute traumatic injury involving the knee. 2. Evidence of degenerative change as described above. 3. Presence of a moderate-sized knee joint effusion. Duplex Lower Ext 10/21/21  Interpretation Summary    Right lower extremity venous duplex negative for deep venous thrombosis or thrombophlebitis. Left common femoral vein is thrombus free. Lower Extremity Venous Findings    Right Lower Venous    No evidence of deep vein thrombosis in the common femoral, profunda femoral, femoral, popliteal, posterior tibial, and peroneal veins. The veins were imaged in the transverse and longitudinal planes. The vessels showed normal color filling and compressibility. Doppler interrogation showed phasic and spontaneous flow. Left Lower Venous    For comparison purposes, the left common femoral vein was briefly interrogated. These vein demonstrates normal color filing and compressibility. Doppler flow was phasic and spontaneous.        Recent Days:  Recent Labs     10/27/21  0439   WBC 7.5   HGB 8.6*   HCT 26.7*        Recent Labs     10/27/21  0439      K 4.2   *   CO2 20*   *   BUN 36*   CREA 1.67*   CA 8.4*       24 Hour Results:  Recent Results (from the past 24 hour(s))   GLUCOSE, POC    Collection Time: 10/28/21  6:49 AM   Result Value Ref Range    Glucose (POC) 94 65 - 117 mg/dL    Performed by Ashley Ashley (PCT)    GLUCOSE, POC    Collection Time: 10/28/21 10:56 AM   Result Value Ref Range    Glucose (POC) 136 (H) 65 - 117 mg/dL    Performed by Niecy [address], POC    Collection Time: 10/28/21  3:49 PM   Result Value Ref Range    Glucose (POC) 115 65 - 117 mg/dL    Performed by Elaina Rosales ([employer])    GLUCOSE, POC    Collection Time: 10/28/21  9:31 PM   Result Value Ref Range    Glucose (POC) 144 (H) 65 - 117 mg/dL    Performed by Wanda Louise (PCT)        Medications reviewed  Current Facility-Administered Medications   Medication Dose Route Frequency    polyethylene glycol (MIRALAX) packet 17 g  17 g Oral BID    naloxone (NARCAN) injection 0.4 mg  0.4 mg IntraVENous PRN    senna-docusate (PERICOLACE) 8.6-50 mg per tablet 1 Tablet  1 Tablet Oral BID    famotidine (PEPCID) tablet 20 mg  20 mg Oral DAILY    heparin (porcine) injection 5,000 Units  5,000 Units SubCUTAneous Q8H    insulin lispro (HUMALOG) injection   SubCUTAneous AC&HS    glucose chewable tablet 16 g  4 Tablet Oral PRN    glucagon (GLUCAGEN) injection 1 mg  1 mg IntraMUSCular PRN    amLODIPine (NORVASC) tablet 10 mg  10 mg Oral DAILY    acetaminophen (TYLENOL) tablet 650 mg  650 mg Oral Q6H    sodium chloride (NS) flush 5-40 mL  5-40 mL IntraVENous Q8H    sodium chloride (NS) flush 5-40 mL  5-40 mL IntraVENous PRN    morphine injection 2 mg  2 mg IntraVENous Q4H PRN    ondansetron (ZOFRAN) injection 4 mg  4 mg IntraVENous Q4H PRN    oxyCODONE IR (ROXICODONE) tablet 5 mg  5 mg Oral Q4H PRN    dextrose (D50W) injection syrg 12.5-25 g  12.5-25 g IntraVENous PRN    hydrALAZINE (APRESOLINE) 20 mg/mL injection 10 mg  10 mg IntraVENous Q6H PRN       Care Plan discussed with: Patient/Family    Total time spent with patient and review of records: 30 minutes.     Brian Delatorre MD no

## 2023-05-13 NOTE — ED ADULT NURSE NOTE - NSFALLUNIVINTERV_ED_ALL_ED
Bed/Stretcher in lowest position, wheels locked, appropriate side rails in place/Call bell, personal items and telephone in reach/Instruct patient to call for assistance before getting out of bed/chair/stretcher/Non-slip footwear applied when patient is off stretcher/[place] to call system/Physically safe environment - no spills, clutter or unnecessary equipment/Purposeful proactive rounding/Room/bathroom lighting operational, light cord in reach

## 2023-05-13 NOTE — ED PROVIDER NOTE - PHYSICAL EXAMINATION
CONSTITUTIONAL: well-appearing, in NAD  SKIN: Warm dry, normal skin turgor, 5 cm horizontal incision scar to the right breast that is clean dry and intact.  HEAD: NCAT  EYES: EOMI, PERRLA, no scleral icterus, conjunctiva pink  ENT: normal pharynx with no erythema or exudates  NECK: Supple; non tender. Full ROM.  CARD: RRR, no murmurs.  RESP: clear to ausculation b/l. No crackles or wheezing.  ABD: soft, non-tender, non-distended, no rebound or guarding.  EXT: Full ROM, no bony tenderness, no pedal edema, no calf tenderness  NEURO: normal motor. normal sensory. CN II-XII intact. Cerebellar testing normal. Normal gait.  PSYCH: Cooperative, appropriate.

## 2023-05-13 NOTE — H&P ADULT - NSHPLABSRESULTS_GEN_ALL_CORE
(05-12 @ 22:21)                      13.1  6.97 )-----------( 171                 38.4    Neutrophils = 4.84 (69.5%)  Lymphocytes = 1.31 (18.8%)  Eosinophils = 0.01 (0.1%)  Basophils = 0.02 (0.3%)  Monocytes = 0.78 (11.2%)  Bands = --%    05-12    130<L>  |  96<L>  |  14  ----------------------------<  89  4.3   |  25  |  0.7    Ca    8.4      12 May 2023 22:21    TPro  6.1  /  Alb  3.7  /  TBili  0.5  /  DBili  x   /  AST  29  /  ALT  9   /  AlkPhos  36  05-12      CARDIAC MARKERS ( 13 May 2023 03:20 )  Trop 0.18 ng/mL<HH> / CK x     / CKMB x       CARDIAC MARKERS ( 12 May 2023 22:21 )  Trop 0.16 ng/mL<HH> / CK x     / CKMB x         EKG: aflutter,     Bedside US: EF moderately decreased, LVH, dilated LA, IVC 2.2cm <50% collapse with respiration.

## 2023-05-13 NOTE — ED PROVIDER NOTE - OBJECTIVE STATEMENT
Patient is a 88y female with pmhx of htn ?chf and breast CA s/p lumpectomy performed by Dr. Mckeon 5/10/23 presenting for evaluation of tachycardia that began this morning. Pt offers no further complaints. Otherwise denies any fever, chills, headache, changes in vision, cough, congestion, cp, sob, n/v/d, abd pain, constipation, urinary complaints, lower extremity pain/swelling.

## 2023-05-13 NOTE — H&P ADULT - NSHPPHYSICALEXAM_GEN_ALL_CORE
T(C): 36.9 (05-12-23 @ 23:29), Max: 37.1 (05-12-23 @ 20:13)  HR: 118 (05-13-23 @ 04:26) (118 - 124)  BP: 120/80 (05-13-23 @ 04:26) (115/73 - 127/72)  RR: 18 (05-13-23 @ 04:26) (16 - 18)  SpO2: 97% (05-13-23 @ 04:26) (96% - 99%)  Wt(kg): --  GENERAL: NAD, well-developed  HEAD:  Atraumatic, Normocephalic  EYES: EOMI, PERRLA, conjunctiva and sclera clear  NECK: Supple, No JVD  CHEST/LUNG: coarse lung sounds  HEART: tachycardia  ABDOMEN: Soft, Nontender, Nondistended; Bowel sounds present  EXTREMITIES:  2+ Peripheral Pulses, No clubbing, cyanosis, or edema  PSYCH: AAOx3  NEUROLOGY: non-focal  SKIN: No rashes or lesions

## 2023-05-13 NOTE — H&P ADULT - HISTORY OF PRESENT ILLNESS
87yo F hx of HTN, HLD, ?HF breast cancer s/p R mastectomy 2 days prior presented from home with elevated HR. Patient reports doing well after surgery. Patient denied uncontrolled pain and reports being compliant on all prescribed medication including Toprol. Patient denied cardiac history or symptoms including CHIANG, chest pain or palpitations. Patient notes 5 months ago, she was started on entresto by her cardiologist in [place] and felt elevated HR/dizziness at the time. Patient now feels similar dizziness prior to ED visit. In the ED, patient was noted in aflutter HR 120s with stable BP. Patient without signs of resp distress and no orthopnea. Patient CT chest revealed no PE and signs of fluid overload. Patient received 500cc bolus in the ED and 5mg IV push metoprolol.

## 2023-05-13 NOTE — H&P ADULT - TIME BILLING
Interviewed and examined patient. Reviewed hospital course, ECG, CXR, tele, lab work, and medications. Discussed plan with patient and family member. Patient requires tele monitoring due to arrythmia.

## 2023-05-13 NOTE — ED PROVIDER NOTE - ATTENDING CONTRIBUTION TO CARE
I personally evaluated the patient. I reviewed the Resident’s or Physician Assistant’s note (as assigned above), and agree with the findings and plan except as documented in my note.  88-year-old female with history of breast CA status postlumpectomy 2 days ago by Dr. Mckeon, HTN, HLD presents with complaints of increased heart rate that occurred several hours prior to arrival.  Patient denies any chest pain, lightheadedness, shortness of breath, lower extremity pain or swelling or LOC.  Patient states that she has been more weak than usual and has been eating and drinking less.  Denies fever.  VSS, non toxic appearing, NAD, Head NCAT, MMM, neck supple, normal ROM, normal s1s2, pulse is regular, tachycardic, lungs ctab, abd s/nt/nd, no guarding or rebound, extremities wnl, AAO x 3, GCS 15, neuro grossly normal. No acute skin lesions. Plan is EKG, labs, imaging, meds as needed and dispo accordingly.

## 2023-05-13 NOTE — ED PROVIDER NOTE - INPATIENT RESIDENT/ACP NOTIFIED
"Pt sob for last several hours. States has been coughing, nauseated, no fevers and no chest pain or pressure, just ""my normal everyday leg pain. \""  " Jaime

## 2023-05-13 NOTE — PHARMACOTHERAPY INTERVENTION NOTE - COMMENTS
Pt was ordered for enoxaparin 50mg q24h. However, since patient's indication is atrial flutter therapeutic anticoagulation, the dosing of enoxaparin for therapeutic dosing is 1mg/kg SQ q12h. Therefore, recommended to increase dosing to 50mg SQ q12h.

## 2023-05-14 LAB
ANION GAP SERPL CALC-SCNC: 11 MMOL/L — SIGNIFICANT CHANGE UP (ref 7–14)
BUN SERPL-MCNC: 16 MG/DL — SIGNIFICANT CHANGE UP (ref 10–20)
CALCIUM SERPL-MCNC: 8.3 MG/DL — LOW (ref 8.4–10.5)
CHLORIDE SERPL-SCNC: 97 MMOL/L — LOW (ref 98–110)
CO2 SERPL-SCNC: 25 MMOL/L — SIGNIFICANT CHANGE UP (ref 17–32)
CREAT SERPL-MCNC: 0.8 MG/DL — SIGNIFICANT CHANGE UP (ref 0.7–1.5)
EGFR: 71 ML/MIN/1.73M2 — SIGNIFICANT CHANGE UP
GLUCOSE SERPL-MCNC: 122 MG/DL — HIGH (ref 70–99)
HCT VFR BLD CALC: 40.7 % — SIGNIFICANT CHANGE UP (ref 37–47)
HGB BLD-MCNC: 13.3 G/DL — SIGNIFICANT CHANGE UP (ref 12–16)
MAGNESIUM SERPL-MCNC: 2 MG/DL — SIGNIFICANT CHANGE UP (ref 1.8–2.4)
MCHC RBC-ENTMCNC: 29.6 PG — SIGNIFICANT CHANGE UP (ref 27–31)
MCHC RBC-ENTMCNC: 32.7 G/DL — SIGNIFICANT CHANGE UP (ref 32–37)
MCV RBC AUTO: 90.4 FL — SIGNIFICANT CHANGE UP (ref 81–99)
NRBC # BLD: 0 /100 WBCS — SIGNIFICANT CHANGE UP (ref 0–0)
PLATELET # BLD AUTO: 189 K/UL — SIGNIFICANT CHANGE UP (ref 130–400)
PMV BLD: 10.7 FL — HIGH (ref 7.4–10.4)
POTASSIUM SERPL-MCNC: 4.1 MMOL/L — SIGNIFICANT CHANGE UP (ref 3.5–5)
POTASSIUM SERPL-SCNC: 4.1 MMOL/L — SIGNIFICANT CHANGE UP (ref 3.5–5)
RBC # BLD: 4.5 M/UL — SIGNIFICANT CHANGE UP (ref 4.2–5.4)
RBC # FLD: 12.8 % — SIGNIFICANT CHANGE UP (ref 11.5–14.5)
SODIUM SERPL-SCNC: 133 MMOL/L — LOW (ref 135–146)
WBC # BLD: 6.07 K/UL — SIGNIFICANT CHANGE UP (ref 4.8–10.8)
WBC # FLD AUTO: 6.07 K/UL — SIGNIFICANT CHANGE UP (ref 4.8–10.8)

## 2023-05-14 PROCEDURE — 99233 SBSQ HOSP IP/OBS HIGH 50: CPT

## 2023-05-14 RX ORDER — APIXABAN 2.5 MG/1
2.5 TABLET, FILM COATED ORAL EVERY 12 HOURS
Refills: 0 | Status: DISCONTINUED | OUTPATIENT
Start: 2023-05-14 | End: 2023-05-16

## 2023-05-14 RX ORDER — METOPROLOL TARTRATE 50 MG
50 TABLET ORAL
Refills: 0 | Status: DISCONTINUED | OUTPATIENT
Start: 2023-05-14 | End: 2023-05-15

## 2023-05-14 RX ADMIN — Medication 50 MILLIGRAM(S): at 05:15

## 2023-05-14 RX ADMIN — ATORVASTATIN CALCIUM 40 MILLIGRAM(S): 80 TABLET, FILM COATED ORAL at 21:22

## 2023-05-14 RX ADMIN — APIXABAN 2.5 MILLIGRAM(S): 2.5 TABLET, FILM COATED ORAL at 17:15

## 2023-05-14 RX ADMIN — Medication 40 MILLIGRAM(S): at 05:15

## 2023-05-14 RX ADMIN — Medication 50 MILLIGRAM(S): at 17:15

## 2023-05-14 RX ADMIN — Medication 40 MILLIGRAM(S): at 14:04

## 2023-05-14 RX ADMIN — ENOXAPARIN SODIUM 50 MILLIGRAM(S): 100 INJECTION SUBCUTANEOUS at 01:20

## 2023-05-14 NOTE — PROGRESS NOTE ADULT - TIME BILLING
Interviewed and examined patient. Reviewed hospital course, ECG, TTE, CXR, tele, lab work, and medications. Discussed plan with patient and family member. Patient requires tele due to high risk of arrhythmias

## 2023-05-15 ENCOUNTER — NON-APPOINTMENT (OUTPATIENT)
Age: 88
End: 2023-05-15

## 2023-05-15 LAB
ANION GAP SERPL CALC-SCNC: 9 MMOL/L — SIGNIFICANT CHANGE UP (ref 7–14)
BUN SERPL-MCNC: 14 MG/DL — SIGNIFICANT CHANGE UP (ref 10–20)
CALCIUM SERPL-MCNC: 8.5 MG/DL — SIGNIFICANT CHANGE UP (ref 8.4–10.5)
CHLORIDE SERPL-SCNC: 96 MMOL/L — LOW (ref 98–110)
CO2 SERPL-SCNC: 26 MMOL/L — SIGNIFICANT CHANGE UP (ref 17–32)
CREAT SERPL-MCNC: 0.7 MG/DL — SIGNIFICANT CHANGE UP (ref 0.7–1.5)
EGFR: 84 ML/MIN/1.73M2 — SIGNIFICANT CHANGE UP
GLUCOSE SERPL-MCNC: 91 MG/DL — SIGNIFICANT CHANGE UP (ref 70–99)
HCT VFR BLD CALC: 37.4 % — SIGNIFICANT CHANGE UP (ref 37–47)
HGB BLD-MCNC: 12.5 G/DL — SIGNIFICANT CHANGE UP (ref 12–16)
MAGNESIUM SERPL-MCNC: 2 MG/DL — SIGNIFICANT CHANGE UP (ref 1.8–2.4)
MCHC RBC-ENTMCNC: 29.9 PG — SIGNIFICANT CHANGE UP (ref 27–31)
MCHC RBC-ENTMCNC: 33.4 G/DL — SIGNIFICANT CHANGE UP (ref 32–37)
MCV RBC AUTO: 89.5 FL — SIGNIFICANT CHANGE UP (ref 81–99)
NRBC # BLD: 0 /100 WBCS — SIGNIFICANT CHANGE UP (ref 0–0)
PLATELET # BLD AUTO: 150 K/UL — SIGNIFICANT CHANGE UP (ref 130–400)
PMV BLD: 10.3 FL — SIGNIFICANT CHANGE UP (ref 7.4–10.4)
POTASSIUM SERPL-MCNC: 4 MMOL/L — SIGNIFICANT CHANGE UP (ref 3.5–5)
POTASSIUM SERPL-SCNC: 4 MMOL/L — SIGNIFICANT CHANGE UP (ref 3.5–5)
RBC # BLD: 4.18 M/UL — LOW (ref 4.2–5.4)
RBC # FLD: 12.6 % — SIGNIFICANT CHANGE UP (ref 11.5–14.5)
SODIUM SERPL-SCNC: 131 MMOL/L — LOW (ref 135–146)
SURGICAL PATHOLOGY STUDY: SIGNIFICANT CHANGE UP
WBC # BLD: 5.35 K/UL — SIGNIFICANT CHANGE UP (ref 4.8–10.8)
WBC # FLD AUTO: 5.35 K/UL — SIGNIFICANT CHANGE UP (ref 4.8–10.8)

## 2023-05-15 PROCEDURE — 99233 SBSQ HOSP IP/OBS HIGH 50: CPT

## 2023-05-15 RX ORDER — APIXABAN 2.5 MG/1
1 TABLET, FILM COATED ORAL
Qty: 60 | Refills: 1
Start: 2023-05-15 | End: 2023-07-13

## 2023-05-15 RX ORDER — APIXABAN 2.5 MG/1
1 TABLET, FILM COATED ORAL
Qty: 60 | Refills: 4
Start: 2023-05-15 | End: 2023-10-11

## 2023-05-15 RX ORDER — METOPROLOL TARTRATE 50 MG
25 TABLET ORAL DAILY
Refills: 0 | Status: DISCONTINUED | OUTPATIENT
Start: 2023-05-15 | End: 2023-05-16

## 2023-05-15 RX ORDER — PANTOPRAZOLE SODIUM 20 MG/1
40 TABLET, DELAYED RELEASE ORAL
Refills: 0 | Status: DISCONTINUED | OUTPATIENT
Start: 2023-05-15 | End: 2023-05-16

## 2023-05-15 RX ADMIN — Medication 650 MILLIGRAM(S): at 10:49

## 2023-05-15 RX ADMIN — Medication 40 MILLIGRAM(S): at 16:01

## 2023-05-15 RX ADMIN — ATORVASTATIN CALCIUM 40 MILLIGRAM(S): 80 TABLET, FILM COATED ORAL at 21:37

## 2023-05-15 RX ADMIN — Medication 50 MILLIGRAM(S): at 05:20

## 2023-05-15 RX ADMIN — APIXABAN 2.5 MILLIGRAM(S): 2.5 TABLET, FILM COATED ORAL at 05:20

## 2023-05-15 RX ADMIN — Medication 650 MILLIGRAM(S): at 10:19

## 2023-05-15 RX ADMIN — PANTOPRAZOLE SODIUM 40 MILLIGRAM(S): 20 TABLET, DELAYED RELEASE ORAL at 08:32

## 2023-05-15 RX ADMIN — Medication 40 MILLIGRAM(S): at 05:20

## 2023-05-15 RX ADMIN — APIXABAN 2.5 MILLIGRAM(S): 2.5 TABLET, FILM COATED ORAL at 17:22

## 2023-05-15 NOTE — PROGRESS NOTE ADULT - TIME BILLING
Interviewed and examined patient. Reviewed hospital course, ECG, TTE, CXR, tele, lab work, and medications. Discussed plan with patient and family member. Coordinated with primary care physician.

## 2023-05-15 NOTE — PROGRESS NOTE ADULT - NS ATTEND AMEND GEN_ALL_CORE FT
I agree with the assessment and plan above, in addition to further documentation below.    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 She recently underwent mastectomy. Suspect that her surgery and volume overload triggered her AFL. CTA was negative for PE.   -Converted to NSR 5/14. Remains in sinus rhythm  -Decrease metoprolol to metoprolol succinate 25 daily  -Continue eliquis 2.5 BID  -Continue diuresis today, can likely switch to PO tomorrow. IVC 2.2, collapsing  -Will refer to outpatient EP for possible ablation
I agree with the assessment and plan above, in addition to further documentation below.    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 She recently underwent mastectomy. Suspect that her surgery and volume overload triggered her AFL. CTA was negative for PE.   -Converted to NSR today  -Decrease metoprolol to 50 BID, likely switch to 25 BID tomorrow  -Start eliquis 2.5 BID  -Continue diuresis today, can likely switch to PO tomorrow  -Will discuss with EP re: ablation outpatient vs inpatient

## 2023-05-15 NOTE — PROGRESS NOTE ADULT - ASSESSMENT
Assessment:  87yo F hx of HTN, HLD, breast cancer s/p R mastectomy 2 days prior presented from home with elevated HR. Patient notes 5 months ago, she was started on entresto by her cardiologist in [place] and felt elevated HR/dizziness at the time. Patient now feels similar dizziness prior to ED visit. Troponin 0.1 without ACS signs, likely 2/2 demand ischemia. Cardiomyopathy, fluid overloaded on exam and US. Patient CT chest revealed no PE and signs of fluid overload. Patient admitted to cardiac tele for aflutter and fluid overload management.    Problems discussed and associated plan:  #Aflutter  s/p self conversion  - Patient likely fluid overloaded and thus triggering aflutter with demand ischemia present.  - Surgical wound assessed per surgery and appears benign. Discussed with surgery on call team and no contraindication to initiate AC.  - c/w metoprolol 50mg BID.   - Lovenox discontinued and Eliquis 2.5mg started  - TSH: 3.15  - Monitor on tele.  - Patient likely to have ablation scheduled outpatient vs inpatient.    #Fluid overload  - CT chest reveals bilateral pulm edema. US reveals non-collapsing IVC.  - Diurese with 40mg lasix IVP BID.   - Strict I&Os monitoring.    #Troponin elevation  peaked at .18  - Likely related to demand ischemia with underlying CAD  - Check TTE for LV functions.  - Monitor on tele.  - C/w BB and atorvastatin 40mg daily.    #breast cancer s/p R mastectomy 2 days prior  - Monitor incision  - Surgery following     Please contact me with any questions or concerns at [phone].  
Assessment:  89yo F hx of HTN, HLD, breast cancer s/p R mastectomy 2 days prior presented from home with elevated HR. Patient notes 5 months ago, she was started on Entresto by her cardiologist in [place] and felt elevated HR/dizziness at the time. Patient now feels similar dizziness prior to ED visit. Troponin 0.1 without ACS signs, likely 2/2 demand ischemia. Cardiomyopathy, fluid overloaded on exam and US. Patient CT chest revealed no PE and signs of fluid overload. Patient admitted to cardiac tele for aflutter and fluid overload management. IVC: 2.2 50% collapsing after 2 days of Lasix 40mg IVP TID.    Problems discussed and associated plan:  #Aflutter  s/p self conversion  - Patient likely fluid overloaded and thus triggering aflutter with demand ischemia present.  - Surgical wound assessed per surgery and appears benign. Discussed with surgery on call team and no contraindication to initiate AC.  - metoprolol succinate 25mg daily  - c/w Eliquis 2.5mg BID  - TSH: 3.15  - Monitor on tele  - Patient likely to have ablation scheduled outpatient     #Fluid overload  - CT chest reveals bilateral pulm edema. US reveals non-collapsing IVC.  - Diurese with 40mg lasix IVP BID. Transition to PO petar prior to discharge  - Strict I&Os monitoring.  - IVC: 2.2, 50% collapsing 5/15    #Troponin elevation  peaked at .18  - Likely related to demand ischemia with underlying CAD  - Check TTE for LV functions.  - Monitor on tele.  - C/w BB and atorvastatin 40mg daily.    #breast cancer s/p R mastectomy   - Monitor incision  - Surgery following     Please contact me with any questions or concerns at [phone].  
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 Patient states she was feeling lightheaded at home and almost passed out, HR was noted to be tachycardic on home monitor and PCP suggested she come to the ED. Upon arrival, tachy to 133, BP normotensive, afebrile. Denies nausea/vomiting, diarrhea/constipation, chest pain, dizziness, vision changes, headache, weakness, paresthesias, dysuria. On examination R breast incision healing well, no fluctuance/drainage, no palpable hematoma.    PLAN:  -Care per primary team  -No acute surgical intervention  -No evidence of bleeding or infection at surgical site  -No contraindication to AC     [facility] [phone] for questions or concerns   
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 Patient states she was feeling lightheaded at home and almost passed out, HR was noted to be tachycardic on home monitor and PCP suggested she come to the ED. Upon arrival, tachy to 133, BP normotensive, afebrile. Denies nausea/vomiting, diarrhea/constipation, chest pain, dizziness, vision changes, headache, weakness, paresthesias, dysuria. On examination R breast incision healing well, no fluctuance/drainage, no palpable hematoma.    PLAN:  -Care per primary team  -No acute surgical intervention  -No evidence of bleeding or infection at surgical site  -No contraindication to AC   -Following cardiology plans    [facility] [phone] for questions or concerns

## 2023-05-15 NOTE — PHYSICAL THERAPY INITIAL EVALUATION ADULT - GENERAL OBSERVATIONS, REHAB EVAL
Attempted to see pt for b/s PT IE however pt and pt's son in room declined. As per son, pt does not require PT services at this time as pt was ambulating independently around entire unit this am without assistance or assistive device. Pt in agreement. Will d/c pt from b/s PT at this time as per family request, encouraged pt/pt's son to notify NSG/PT should they change or mind or situation change as PT services can be reconsulted.

## 2023-05-15 NOTE — PROGRESS NOTE ADULT - SUBJECTIVE AND OBJECTIVE BOX
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 She is resting comfortably in bed. Patient denies any acute symptoms including chest pain, shortness of breath, nausea, vomiting, fever, or chills. Tolerating diet. Family present at bedside at time of encounter.       PAST MEDICAL & SURGICAL HISTORY:  Hypertension      Hyperlipidemia      GERD (gastroesophageal reflux disease)      Osteoporosis      S/P radiation therapy      H/O fibromyalgia      S/P lumpectomy, right breast          Vitals:   T(F): 98.4 (05-14-23 @ 23:48), Max: 98.4 (05-14-23 @ 23:48)  HR: 61 (05-14-23 @ 23:48)  BP: 131/60 (05-14-23 @ 23:48)  RR: 18 (05-14-23 @ 23:48)  SpO2: 93% (05-14-23 @ 23:48)      Diet, DASH/TLC:   Sodium & Cholesterol Restricted  1500mL Fluid Restriction ([id number])      Fluids:     I & O's:    05-13-23 @ 07:01  -  05-14-23 @ 07:00  --------------------------------------------------------  IN:    Heparin Infusion: 30 mL    Oral Fluid: 460 mL  Total IN: 490 mL    OUT:    Voided (mL): 2350 mL  Total OUT: 2350 mL    Total NET: -1860 mL      PHYSICAL EXAM:  General: NAD, AAOx3, calm and cooperative  HEENT: NCAT  Cardiac: No peripheral cyanosis or pallor, extremities well perfused   Respiratory: Non-labored breathing, equal chest rise bilaterally   Abdomen: Soft, non-distended, non-tender, no rebound, no guarding  Skin: Warm/dry, normal color, no jaundice  Incision/wound: healing well, Dermabond intact with gauze and sports bra, patient has mild erythema from Velcro on on sports bra      MEDICATIONS  (STANDING):  apixaban 2.5 milliGRAM(s) Oral every 12 hours  atorvastatin 40 milliGRAM(s) Oral at bedtime  furosemide   Injectable 40 milliGRAM(s) IV Push two times a day  metoprolol tartrate 50 milliGRAM(s) Oral two times a day    MEDICATIONS  (PRN):  acetaminophen     Tablet .. 650 milliGRAM(s) Oral every 6 hours PRN Moderate Pain (4 - 6)      DVT PROPHYLAXIS:   GI PROPHYLAXIS:   ANTICOAGULATION:   ANTIBIOTICS:            LAB/STUDIES:  Labs:  CAPILLARY BLOOD GLUCOSE                              13.3   6.07  )-----------( 189      ( 14 May 2023 05:38 )             40.7         05-14    133<L>  |  97<L>  |  16  ----------------------------<  122<H>  4.1   |  25  |  0.8      Calcium, Total Serum: 8.3 mg/dL (05-14-23 @ 05:38)      LFTs:     Lactate, Blood: 1.7 mmol/L (05-13-23 @ 06:21)      Coags:     x      ----< x       ( 13 May 2023 17:34 )     41.8        CARDIAC MARKERS ( 13 May 2023 12:23 )  x     / 0.15 ng/mL / x     / x     / x          
GENERAL SURGERY PROGRESS NOTE    Patient: SARANYA RALPH , 88y ([birth year])Female   MRN: [id number]  Location: [address]  Visit: 05-13-23 Inpatient  Date: 05-14-23 @ 01:21    Hospital Day #: 2  Post-operative Day #: 4    Procedure/Dx/Injuries: s/p re-excision of margin of prior intraductal papillary carcinoma    Patient seen and evaluated at bedside. She is resting comfortably in bed. She remains tachycardic throughout the day, with adequate blood pressure. Patient denies any acute symptoms including chest pain, shortness of breath, nausea, vomiting, fever, or chills. Tolerating diet. Family present at bedside at time of encounter.     PAST MEDICAL & SURGICAL HISTORY:  Hypertension  Hyperlipidemia  GERD (gastroesophageal reflux disease)  Osteoporosis  S/P radiation therapy  H/O fibromyalgia  S/P lumpectomy, right breast    Vitals:   T(F): 96.6 (05-13-23 @ 16:29), Max: 97.7 (05-13-23 @ 06:16)  HR: 125 (05-13-23 @ 16:29)  BP: 106/67 (05-13-23 @ 16:29)  RR: 18 (05-13-23 @ 16:29)  SpO2: 98% (05-13-23 @ 16:29)    Diet, DASH/TLC:   Sodium & Cholesterol Restricted  1500mL Fluid Restriction ([id number])    Fluids:     I & O's:    PHYSICAL EXAM:  General: NAD, AAOx3, calm and cooperative  HEENT: NCAT  Cardiac: No peripheral cyanosis or pallor, extremities well perfused   Respiratory: Non-labored breathing, equal chest rise bilaterally   Abdomen: Soft, non-distended, non-tender, no rebound, no guarding  Musculoskeletal: Strength 5/5 BL UE/LE, ROM intact, compartments soft  Neuro: Sensation grossly intact and equal throughout, no focal deficits  Vascular: Pulses 2+ throughout, extremities well perfused  Skin: Warm/dry, normal color, no jaundice  Incision/wound: healing well, Dermabond intact with gauze and sports bra, patient has mild erythema from Velcro on on sports bra    MEDICATIONS  (STANDING):  atorvastatin 40 milliGRAM(s) Oral at bedtime  enoxaparin Injectable 50 milliGRAM(s) SubCutaneous every 12 hours  furosemide   Injectable 40 milliGRAM(s) IV Push two times a day  metoprolol tartrate 50 milliGRAM(s) Oral three times a day    MEDICATIONS  (PRN):  acetaminophen     Tablet .. 650 milliGRAM(s) Oral every 6 hours PRN Moderate Pain (4 - 6)      DVT PROPHYLAXIS: enoxaparin Injectable 50 milliGRAM(s) SubCutaneous every 12 hours    GI PROPHYLAXIS:   ANTICOAGULATION:   ANTIBIOTICS:      LAB/STUDIES:  Labs:  CAPILLARY BLOOD GLUCOSE                              14.5   7.17  )-----------( 201      ( 13 May 2023 12:06 )             44.2       05-13    138  |  101  |  14  ----------------------------<  96  4.1   |  25  |  0.7    Calcium, Total Serum: 8.8 mg/dL (05-13-23 @ 17:34)    LFTs:             6.1  | 0.5  | 29       ------------------[36      ( 12 May 2023 22:21 )  3.7  | x    | 9           Lipase:x      Amylase:x         Lactate, Blood: 1.7 mmol/L (05-13-23 @ 06:21)      Coags:     x      ----< x       ( 13 May 2023 17:34 )     41.8      CARDIAC MARKERS ( 13 May 2023 12:23 )  x     / 0.15 ng/mL / x     / x     / x      CARDIAC MARKERS ( 13 May 2023 03:20 )  x     / 0.18 ng/mL / x     / x     / x      CARDIAC MARKERS ( 12 May 2023 22:21 )  x     / 0.16 ng/mL / x     / x     / x        IMAGING:    < from: CT Angio Chest PE Protocol w/ IV Cont (05.13.23 @ 00:51) >  IMPRESSION:    No CTA evidence of acute pulmonary embolus.    Tree-in-bud nodularity predominantly within the right upper lobe and to a   lesser degree left upper and left lower lobe. Findings likely represent   small airways infection/inflammation.    Nonspecific left anterolateral third rib focal sclerosis.    --- End of Report ---    < end of copied text >    < from: Xray Chest 1 View- PORTABLE-Urgent (05.12.23 @ 22:53) >  Impression:    Bilateral opacities. Cardiomegaly.      --- End of Report ---    < end of copied text >      
Chief complaint: Patient is a 88y old  Female who presents with a chief complaint of Elevated HR (14 May 2023 01:19)    Interval history: Patient seen and examined this AM. Son at patient's bedside. Patient spontaneously converted to NS.    Review of systems: A complete 10-point review of systems was obtained and is negative except as stated in the interval history.    Vitals:  T(F): 97, Max: 98.4 (05-14 @ 03:48)  HR: 69 (69 - 125)  BP: 115/57 (103/63 - 124/73)  RR: 18 (18 - 18)  SpO2: 97% (97% - 98%)    Ins & outs:     05-13 @ 07:01  -  05-14 @ 07:00  --------------------------------------------------------  IN: 490 mL / OUT: 2350 mL / NET: -1860 mL    05-14 @ 07:01  -  05-14 @ 12:14  --------------------------------------------------------  IN: 220 mL / OUT: 200 mL / NET: 20 mL      Weight trend:  Weight (kg): 51.5 (05-13), 52.2 (05-10)    Physical exam:  General: No apparent distress  HEENT: Anicteric sclera. Moist mucous membranes. no JVP.   Cardiac: Regular rate and rhythm. No murmurs, rubs, or gallops.   Vascular: Symmetric radial pulses. Dorsalis pedis pulses palpable.   Respiratory: Normal effort. Bilateral crackles.   Abdomen: Soft, nontender. Audible bowel sounds.   Extremities: Warm with +1 edema. No cyanosis or clubbing.   Skin: Warm and dry. No rash.   Neurologic: Grossly normal motor function.   Psychiatric: Oriented to person, place, and time.   Incision/wound: healing well, Dermabond intact with gauze and sports bra, patient has mild erythema from Velcro on on sports bra    Data reviewed:  - Telemetry: Aflutter converted to NS overnight HR: ~58 currently    - ECG (3/30/23):   Ventricular Rate 78 BPM    Atrial Rate 78 BPM    P-R Interval 180 ms    QRS Duration 78 ms    Q-T Interval 384 ms    QTC Calculation(Bazett) 437 ms    P Axis 77 degrees    R Axis -2 degrees    T Axis 4 degrees    Diagnosis Line Sinus rhythm with Premature supraventricular complexes  Otherwise normal ECG    Confirmed by RA OTERO MD ([id number]) on 3/31/2023 7:27:45 AM    - TTE (5/13/23):   Summary:   1. Normal global left ventricular systolic function with a biplane EF of   64 %. Diastolic function could not be assessed in this study. Normal   internal cavity size and wall thickness.   2. Normal right ventricular size and function.   3. Mildly enlarged left atrium.   4.Sclerotic aortic valve with normal opening and trivial regurgitation.   5. Mild mitral valve regurgitation.   6. Mild pulmonary hypertension (PASP = 42mmHg).   7. Intra-atrial septal aneurysm.   8. There is no evidence of pericardial effusion.    - Chest x-ray (5/12/23):   Impression:  Bilateral opacities. Cardiomegaly.    - CT angio chest:  IMPRESSION:    No CTA evidence of acute pulmonary embolus.    Tree-in-bud nodularity predominantly within the right upper lobe and to a   lesser degree left upper and left lower lobe. Findings likely represent   small airways infection/inflammation.    Nonspecific left anterolateral third rib focal sclerosis.    --- End of Report ---      - Labs:                        13.3   6.07  )-----------( 189      ( 14 May 2023 05:38 )             40.7     05-14    133<L>  |  97<L>  |  16  ----------------------------<  122<H>  4.1   |  25  |  0.8    Ca    8.3<L>      14 May 2023 05:38  Mg     2.0     05-14    TPro  6.1  /  Alb  3.7  /  TBili  0.5  /  DBili  x   /  AST  29  /  ALT  9   /  AlkPhos  36  05-12    PTT - ( 13 May 2023 17:34 )  PTT:41.8 sec  Troponin T, Serum: 0.15 ng/mL (05-13-23 @ 12:23)  Troponin T, Serum: 0.18 ng/mL (05-13-23 @ 03:20)  Troponin T, Serum: 0.16 ng/mL (05-12-23 @ 22:21)          Thyroid Stimulating Hormone, Serum: 3.15 uIU/mL (05-13-23 @ 06:21)        Medications:  apixaban 2.5 milliGRAM(s) Oral every 12 hours  atorvastatin 40 milliGRAM(s) Oral at bedtime  furosemide   Injectable 40 milliGRAM(s) IV Push two times a day  metoprolol tartrate 50 milliGRAM(s) Oral two times a day    Drips:    PRN:     Allergies    phenobarbital (Hives)    Intolerances      
Chief complaint: Patient is a 88y old  Female who presents with a chief complaint of Elevated HR (14 May 2023 01:19)    Interval history: Patient seen and examined this AM. Son at patient's bedside. Patient experiencing GERD this AM, pantoprazole given.    Review of systems: A complete 10-point review of systems was obtained and is negative except as stated in the interval history.    Vitals:  ICU Vital Signs Last 24 Hrs  T(C): 35.8 (15 May 2023 07:22), Max: 36.9 (14 May 2023 23:48)  T(F): 96.5 (15 May 2023 07:22), Max: 98.4 (14 May 2023 23:48)  HR: 57 (15 May 2023 07:22) (57 - 70)  BP: 145/65 (15 May 2023 07:22) (114/59 - 145/65)  BP(mean): 94 (15 May 2023 07:22) (78 - 94)  RR: 18 (15 May 2023 07:22) (18 - 18)  SpO2: 93% (15 May 2023 04:29) (93% - 96%)        Ins & outs:     05-13 @ 07:01  -  05-14 @ 07:00  --------------------------------------------------------  IN: 490 mL / OUT: 2350 mL / NET: -1860 mL    14 May 2023 07:01  -  15 May 2023 07:00  --------------------------------------------------------  IN: 930 mL / OUT: 1760 mL / NET: -830 mL    15 May 2023 07:01  -  15 May 2023 14:33  --------------------------------------------------------  IN: 100 mL / OUT: 650 mL / NET: -550 mL      Weight trend:  Weight (kg): 51.5 (05-13), 52.2 (05-10)    Physical exam:  General: No apparent distress  HEENT: Anicteric sclera. Moist mucous membranes. no JVP.   Cardiac: Regular rate and rhythm. No murmurs, rubs, or gallops.   Vascular: Symmetric radial pulses. Dorsalis pedis pulses palpable.   Respiratory: Normal effort. Bilateral crackles.   Abdomen: Soft, nontender. Audible bowel sounds.   Extremities: Warm with +1 edema. No cyanosis or clubbing.   Skin: Warm and dry. No rash.   Neurologic: Grossly normal motor function.   Psychiatric: Oriented to person, place, and time.   Incision/wound: healing well, Dermabond intact with gauze and sports bra, patient has mild erythema from Velcro on sports bra.    Data reviewed:  - Telemetry: NS PACs HR: 64-121bpm    - ECG (3/30/23):   Ventricular Rate 78 BPM  Atrial Rate 78 BPM  P-R Interval 180 ms  QRS Duration 78 ms  Q-T Interval 384 ms  QTC Calculation(Bazett) 437 ms  P Axis 77 degrees  R Axis -2 degrees  T Axis 4 degrees  Diagnosis Line Sinus rhythm with Premature supraventricular complexes  Otherwise normal ECG      - TTE (5/13/23):   Summary:   1. Normal global left ventricular systolic function with a biplane EF of   64 %. Diastolic function could not be assessed in this study. Normal   internal cavity size and wall thickness.   2. Normal right ventricular size and function.   3. Mildly enlarged left atrium.   4.Sclerotic aortic valve with normal opening and trivial regurgitation.   5. Mild mitral valve regurgitation.   6. Mild pulmonary hypertension (PASP = 42mmHg).   7. Intra-atrial septal aneurysm.   8. There is no evidence of pericardial effusion.    - Chest x-ray (5/12/23):   Impression:  Bilateral opacities. Cardiomegaly.    - CT angio chest:  IMPRESSION:  No CTA evidence of acute pulmonary embolus.  Tree-in-bud nodularity predominantly within the right upper lobe and to a   lesser degree left upper and left lower lobe. Findings likely represent   small airways infection/inflammation.  Nonspecific left anterolateral third rib focal sclerosis.      - Labs:                        12.5   5.35  )-----------( 150      ( 15 May 2023 05:56 )             37.4     05-15    131<L>  |  96<L>  |  14  ----------------------------<  91  4.0   |  26  |  0.7    Ca    8.5      15 May 2023 05:56  Mg     2.0     05-15        PTT - ( 13 May 2023 17:34 )  PTT:41.8 sec          Lactate Trend  05-13 @ 06:21 Lactate:1.7         Medications:  MEDICATIONS  (STANDING):  apixaban 2.5 milliGRAM(s) Oral every 12 hours  atorvastatin 40 milliGRAM(s) Oral at bedtime  furosemide   Injectable 40 milliGRAM(s) IV Push two times a day  metoprolol succinate ER 25 milliGRAM(s) Oral daily  pantoprazole    Tablet 40 milliGRAM(s) Oral before breakfast    MEDICATIONS  (PRN):  acetaminophen     Tablet .. 650 milliGRAM(s) Oral every 6 hours PRN Moderate Pain (4 - 6)    Allergies    phenobarbital (Hives)    Intolerances

## 2023-05-16 ENCOUNTER — TRANSCRIPTION ENCOUNTER (OUTPATIENT)
Age: 88
End: 2023-05-16

## 2023-05-16 VITALS
RESPIRATION RATE: 17 BRPM | HEART RATE: 61 BPM | OXYGEN SATURATION: 93 % | TEMPERATURE: 97 F | SYSTOLIC BLOOD PRESSURE: 122 MMHG | DIASTOLIC BLOOD PRESSURE: 67 MMHG

## 2023-05-16 PROCEDURE — 99233 SBSQ HOSP IP/OBS HIGH 50: CPT

## 2023-05-16 RX ORDER — FUROSEMIDE 40 MG
1 TABLET ORAL
Qty: 30 | Refills: 0
Start: 2023-05-16 | End: 2023-06-14

## 2023-05-16 RX ORDER — APIXABAN 2.5 MG/1
1 TABLET, FILM COATED ORAL
Qty: 60 | Refills: 0
Start: 2023-05-16 | End: 2023-06-14

## 2023-05-16 RX ADMIN — Medication 25 MILLIGRAM(S): at 05:40

## 2023-05-16 RX ADMIN — PANTOPRAZOLE SODIUM 40 MILLIGRAM(S): 20 TABLET, DELAYED RELEASE ORAL at 06:47

## 2023-05-16 RX ADMIN — APIXABAN 2.5 MILLIGRAM(S): 2.5 TABLET, FILM COATED ORAL at 05:40

## 2023-05-16 RX ADMIN — Medication 40 MILLIGRAM(S): at 05:40

## 2023-05-16 NOTE — DISCHARGE NOTE PROVIDER - HOSPITAL COURSE
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 She recently underwent mastectomy. Suspect that her surgery and volume overload triggered her AFL. CTA was negative for PE.   -Converted to NSR 5/14. Remains in sinus rhythm  -Decrease metoprolol to metoprolol succinate 25 daily  -Continue eliquis 2.5 BID  -Continue diuresis today, can likely switch to PO tomorrow. IVC 2.2, collapsing  -Will refer to outpatient EP for possible ablation.    SARANYA RALPH is a 88y Female with PMHx who is presenting with palpitations and found to have atrial flutter with RVR, which is new for her. She recently underwent mastectomy. Suspect that her surgery and volume overload triggered her AFL. CTA was negative for PE. She was diuresed, and started on rate control. With diuresis, she self-converted to sinus rhythm. She was discharged on eliquis 2.5 BID and metoprolol succinate 25 daily. She will follow up with her outpatient cardiologist. Can consider referral to EP for possible ablation. SARANYA RALPH is a 88y Female with PMHx who is presenting with palpitations and found to have atrial flutter with RVR, which is new for her. She recently underwent mastectomy. Suspect that her surgery and volume overload triggered her AFL. CTA was negative for PE. She was diuresed, and started on rate control. With diuresis, she self-converted to sinus rhythm. She was discharged on eliquis 2.5 BID and metoprolol succinate 25 daily. She will follow up with her outpatient cardiologist. Can consider referral to EP for possible ablation.    TTE 5/13/23  Summary:   1. Normal global left ventricular systolic function with a biplane EF of   64 %. Diastolic function could not be assessed in this study. Normal   internal cavity size and wall thickness.   2. Normal right ventricular size and function.   3. Mildly enlarged left atrium.   4.Sclerotic aortic valve with normal opening and trivial regurgitation.   5. Mild mitral valve regurgitation.   6. Mild pulmonary hypertension (PASP = 42mmHg).   7. Intra-atrial septal aneurysm.   8. There is no evidence of pericardial effusion.   SARANYA RALPH is a 88y Female with PMHx who is presenting with palpitations and found to have atrial flutter with RVR, which is new for her. She recently underwent lumpectomy. Suspect that her surgery and volume overload triggered her AFL. CTA was negative for PE. She was diuresed, and started on rate control. With diuresis, she self-converted to sinus rhythm. She was discharged on eliquis 2.5 BID and metoprolol succinate 25 daily. She will follow up with her outpatient cardiologist. Can consider referral to EP for possible ablation.    TTE 5/13/23  Summary:   1. Normal global left ventricular systolic function with a biplane EF of   64 %. Diastolic function could not be assessed in this study. Normal   internal cavity size and wall thickness.   2. Normal right ventricular size and function.   3. Mildly enlarged left atrium.   4.Sclerotic aortic valve with normal opening and trivial regurgitation.   5. Mild mitral valve regurgitation.   6. Mild pulmonary hypertension (PASP = 42mmHg).   7. Intra-atrial septal aneurysm.   8. There is no evidence of pericardial effusion.   SARANYA RALPH is a 88y Female with PMHx who is presenting with palpitations and found to have atrial flutter with RVR, which is new for her. She recently underwent lumpectomy. Suspect that her surgery and volume overload triggered her AFL. CTA was negative for PE. She was diuresed, and started on rate control. With diuresis, she self-converted to sinus rhythm. She was discharged on eliquis 2.5 BID and metoprolol succinate 25 daily. She will follow up with her outpatient cardiologist. Can consider referral to EP for possible ablation.     TTE 5/13/23  Summary:   1. Normal global left ventricular systolic function with a biplane EF of   64 %. Diastolic function could not be assessed in this study. Normal   internal cavity size and wall thickness.   2. Normal right ventricular size and function.   3. Mildly enlarged left atrium.   4.Sclerotic aortic valve with normal opening and trivial regurgitation.   5. Mild mitral valve regurgitation.   6. Mild pulmonary hypertension (PASP = 42mmHg).   7. Intra-atrial septal aneurysm.   8. There is no evidence of pericardial effusion.

## 2023-05-16 NOTE — DISCHARGE NOTE PROVIDER - CARE PROVIDER_API CALL
Viola Mckeon ()  Surgery  Compr Breast  [address] [facility] [address], [facility] [address]nd [place] [address]  Phone: [phone]  Fax: [phone]  Scheduled Appointment: 05/25/2023

## 2023-05-16 NOTE — DISCHARGE NOTE PROVIDER - ATTENDING DISCHARGE PHYSICAL EXAMINATION:
Physical Exam  T(C): 36.1 (05-16-23 @ 08:00), Max: 37.3 (05-15-23 @ 15:30)  HR: 61 (05-16-23 @ 08:00) (61 - 66)  BP: 122/67 (05-16-23 @ 08:00) (103/54 - 157/74)  RR: 17 (05-16-23 @ 08:00) (16 - 19)  SpO2: 93% (05-16-23 @ 08:00) (93% - 97%)  Gen: NAD  CV: RRR, nl S1 and S2, no m/r/g, no LE edema, no JVD  Pulm: CTAB, no crackles  GI: soft, nontender  MSK: normal ROM  Extremities: warm  Neuro: A+Ox3  Psych: cooperative Physical Exam  T(C): 36.1 (05-16-23 @ 08:00), Max: 37.3 (05-15-23 @ 15:30)  HR: 61 (05-16-23 @ 08:00) (61 - 66)  BP: 122/67 (05-16-23 @ 08:00) (103/54 - 157/74)  RR: 17 (05-16-23 @ 08:00) (16 - 19)  SpO2: 93% (05-16-23 @ 08:00) (93% - 97%)  Gen: NAD  CV: RRR, nl S1 and S2, no m/r/g, no LE edema, no JVD  Pulm: CTAB, no crackles  GI: soft, nontender  MSK: normal ROM   Extremities: warm  Neuro: A+Ox3  Psych: cooperative Physical Exam  T(C): 36.1 (05-16-23 @ 08:00), Max: 37.3 (05-15-23 @ 15:30)  HR: 61 (05-16-23 @ 08:00) (61 - 66)  BP: 122/67 (05-16-23 @ 08:00) (103/54 - 157/74)  RR: 17 (05-16-23 @ 08:00) (16 - 19)  SpO2: 93% (05-16-23 @ 08:00) (93% - 97%)  Gen: NAD  CV: RRR, nl S1 and S2, no m/r/g, no LE edema, no JVD  Pulm: CTAB, no crackles  GI: soft, nontender  MSK: normal ROM   Extremities: warm  Neuro: A+Ox3

## 2023-05-16 NOTE — DISCHARGE NOTE PROVIDER - NSDCFUSCHEDAPPT_GEN_ALL_CORE_FT
Viola Mckeon  [facility]  BREAST [address]  Scheduled Appointment: 05/25/2023

## 2023-05-16 NOTE — DISCHARGE NOTE PROVIDER - NSDCMRMEDTOKEN_GEN_ALL_CORE_FT
Amlodipine 4mg QHS:   Biotin 500mg daily:   Caltrate 2 tbs daily:   Crestor 5 mg oral tablet: 1 tab(s) orally once a day (at bedtime)  Lasix 40 mg oral tablet: 1 tab(s) orally once a day If you experience shortness of breath or weight gain of 2lb in a day or 5lb in a week please take an extra dose of Lasix. MDD: 4  LORazepam 0.5 mg oral tablet:   Omeprazole 20mg I cap daily:   Prolia injection every six months:   Toprol-XL 25 mg oral tablet, extended release: 1 tab(s) orally once a day  Vitamin C 1000mg daily:    Amlodipine 4mg QHS:   apixaban 2.5 mg oral tablet: 1 tab(s) orally every 12 hours  Biotin 500mg daily:   Caltrate 2 tbs daily:   Crestor 5 mg oral tablet: 1 tab(s) orally once a day (at bedtime)  Lasix 40 mg oral tablet: 1 tab(s) orally once a day If you experience shortness of breath or weight gain of 2lb in a day or 5lb in a week please take an extra dose of Lasix. MDD: 4  LORazepam 0.5 mg oral tablet:   Omeprazole 20mg I cap daily:   Prolia injection every six months:   Toprol-XL 25 mg oral tablet, extended release: 1 tab(s) orally once a day  Vitamin C 1000mg daily:

## 2023-05-16 NOTE — DISCHARGE NOTE PROVIDER - NSDCQMSTROKE_NEU_ALL_CORE
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    #Fever r/o PNA c/b Parapneumonic Effusions   Oxygen via NC  CXR +blunted costophrenic angles  CT Chest with b/l pleural effusions. - Needs repeat SCan in 4-6 weeks for b/l pulmonary nodules Discontinue Augmentin  C/w Zosyn  Vancomycin 1g IV x1  COVID/RVP Negative  Repeat BCX pending  Check MRSA PCR/Legionella/Strep PNA  CT Surgery recs appreciated  Vascular Sx recs appreciated  ID recs appreciated     #PAD s/p L Fem Bypass with cryovein c/b acute graft rethrombosis s/p thrombectomy now s/p L AKA  Fentanyl Patch  Vascular Surgery recs appreciated    #Hx LLE DVT, Hx AFIB  Hemorrhagic Shock last admission on Eliquis  AC Held during last hospitalization    #ESRD/HD   Torsemide 20mg PO q24  Renal recs appreciated  Nephrovite Daily  Renal Diet + Sevelamer TID ACHS    #Hypotension/Bradycardia   /43 on admission.   250cc IVFB NSS given in ED  Hold Nifedipine 90mg qAM  Southy consulted     #HTN, HLD, CAD, CHFpEF  Torsemide 20mg PO q24  Hold Nifedipine 90mg PO q24  ASA 81mg PO q24  Atorvastatin 80mg PO q24  Imdur 30mg ER q24    #Delirium  Melatonin 3mg PO q24  Trazodone 50mg PO q24  Seroquel 25mg PO qHS -> Discussed titrating dose up until patient able to sleep overnight if no sleep tonight would go to 50mg PO BID     #Constipation  Hold Miralax and Senna given antibiotic-induced loose stool reported    Goals of Care  -DNR/DNI Confirmed. See MOLST Prior. Family wants all other medical and surgical options apart from CPR/Mechanical Ventilation. Palliative Consult for continuity.      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    #Fever r/o PNA c/b Parapneumonic Effusions   Oxygen via NC  CXR +blunted costophrenic angles  CT Chest with b/l pleural effusions. - Needs repeat SCan in 4-6 weeks for b/l pulmonary nodules Discontinue Augmentin  C/w Zosyn  Vancomycin 1g IV x1  COVID/RVP Negative  Repeat BCX pending  Check MRSA PCR/Legionella/Strep PNA  CT Surgery recs appreciated  Vascular Sx recs appreciated  ID recs appreciated     #PAD s/p L Fem Bypass with cryovein c/b acute graft rethrombosis s/p thrombectomy now s/p L AKA  Fentanyl Patch  Vascular Surgery recs appreciated    #Hx LLE DVT, Hx AFIB  Hemorrhagic Shock last admission on Eliquis  AC Held during last hospitalization    #ESRD/HD   Torsemide 20mg PO q24  Renal recs appreciated  Nephrovite Daily  Renal Diet + Sevelamer TID ACHS    #Hypotension/Bradycardia   /43 on admission.   250cc IVFB NSS given in ED  Hold Nifedipine 90mg qAM  Southy consulted     #HTN, HLD, CAD, CHFpEF  Torsemide 20mg PO q24  Hold Nifedipine 90mg PO q24  ASA 81mg PO q24  Atorvastatin 80mg PO q24  Imdur 30mg ER q24    #Delirium  Melatonin 3mg PO q24  Trazodone 50mg PO q24  Seroquel 25mg PO qHS -> Discussed titrating dose up until patient able to sleep overnight if no sleep tonight would go to 50mg PO BID     #Constipation  Hold Miralax and Senna given antibiotic-induced loose stool reported    Goals of Care  -DNR/DNI Confirmed. See MOLST Prior. Family wants all other medical and surgical options apart from CPR/Mechanical Ventilation. Palliative Consult for continuity.      Dispo: Pending course    Spoke to patients daughter Vanessa over the phone and all questions answered.  No

## 2023-05-16 NOTE — DISCHARGE NOTE NURSING/CASE MANAGEMENT/SOCIAL WORK - PATIENT PORTAL LINK FT
You can access the FollowMyHealth Patient Portal offered by [facility] by registering at the following website: [email or web address]. By joining [employer]’s FollowMyHealth portal, you will also be able to view your health information using other applications (apps) compatible with our system.

## 2023-05-17 RX ORDER — APIXABAN 2.5 MG/1
1 TABLET, FILM COATED ORAL
Qty: 60 | Refills: 0
Start: 2023-05-17 | End: 2023-06-15

## 2023-05-18 DIAGNOSIS — C50.911 MALIGNANT NEOPLASM OF UNSPECIFIED SITE OF RIGHT FEMALE BREAST: ICD-10-CM

## 2023-05-18 DIAGNOSIS — H90.5 UNSPECIFIED SENSORINEURAL HEARING LOSS: ICD-10-CM

## 2023-05-18 DIAGNOSIS — M81.0 AGE-RELATED OSTEOPOROSIS WITHOUT CURRENT PATHOLOGICAL FRACTURE: ICD-10-CM

## 2023-05-18 DIAGNOSIS — K21.9 GASTRO-ESOPHAGEAL REFLUX DISEASE WITHOUT ESOPHAGITIS: ICD-10-CM

## 2023-05-18 DIAGNOSIS — E78.5 HYPERLIPIDEMIA, UNSPECIFIED: ICD-10-CM

## 2023-05-18 DIAGNOSIS — Z92.3 PERSONAL HISTORY OF IRRADIATION: ICD-10-CM

## 2023-05-18 DIAGNOSIS — I10 ESSENTIAL (PRIMARY) HYPERTENSION: ICD-10-CM

## 2023-05-25 ENCOUNTER — APPOINTMENT (OUTPATIENT)
Dept: BREAST CENTER | Facility: CLINIC | Age: 88
End: 2023-05-25
Payer: MEDICARE

## 2023-05-25 VITALS
WEIGHT: 115 LBS | HEIGHT: 56 IN | BODY MASS INDEX: 25.87 KG/M2 | SYSTOLIC BLOOD PRESSURE: 153 MMHG | DIASTOLIC BLOOD PRESSURE: 92 MMHG

## 2023-05-25 DIAGNOSIS — C50.919 MALIGNANT NEOPLASM OF UNSPECIFIED SITE OF UNSPECIFIED FEMALE BREAST: ICD-10-CM

## 2023-05-25 PROCEDURE — 99024 POSTOP FOLLOW-UP VISIT: CPT

## 2023-05-25 NOTE — ASSESSMENT
[FreeTextEntry1] : Patient is a 88F with history of right papillary carcinoma s/p WLE in 1/2012, with no radiation or tamoxifen. She then had right papillary carcinoma in 2/2016 that was excised and found to have invasion, s/p PBI. She was on anastrozole which was discontinued due to SE.  She now presents with recurrent right breast invasive papillary carcinoma (+/+/-) s/p WLE on 4/21/23 with pathology showing IDC (1.2mm) adjacent to focus of recurrent invasive papillary carcinoma (7mm). Solid papillary carcinoma extends focally to involve lateral margin.  She underwent right breast lateral margin re-excision on 5/10/23 with final pathology showing benign tissue.  She was recently hospitalized for atrial flutter and is being followed by cardiology. States she is doing well now.  We discussed her final pathology. We discussed that all her margins are now negative and there is no indication for further surgery.  She will be referred to medical oncology for possible restarting of her AI.  She has a history of right breast PBI and is not interested in a radiation oncology referral.  All questions and concerns were answered in detail.  Patient is for right mmg in 11/2023.  She is to follow up with medical oncology.  She is to follow up after imaging, pending any interval changes.  Total time spent on encounter was greater than 20 minutes, which included face to face time with the patient, performing an exam, reviewing previous medical records including imaging/ pathology, documenting in patient record and coordinating care/imaging. Greater than 50% of the encounter was spent on counseling and coordination of her breast issue.

## 2023-05-25 NOTE — PHYSICAL EXAM
[Normocephalic] : normocephalic [EOMI] : extra ocular movement intact [No Rashes] : no rashes [No Ulceration] : no ulceration [de-identified] : Nl respirations [de-identified] : incision site healing well with no signs of infection/dehiscence

## 2023-05-25 NOTE — HISTORY OF PRESENT ILLNESS
[FreeTextEntry1] : Patient is a 88F with history of right breast cancer x 2 (2012, 2016) with recent diagnosis of right recurrent invasive papillay carcinoma (+/+/-) s/p WLE on 4/21/23 with final pathology showing IDC (1.2mm) arising next to nodular focus of recurrent invasive solid papillary carcinoma (7mm). No LVI/PNI. Solid papillary carcinoma/in situ component extends focally to involve lateral margin (+/+/-). She is s/p right lateral margin re-excision showing benign tissue.\par pT1a,pNx,pMx\par \par History of right breast solid papillary carcinoma s/p WLE in 1/5/12 demonstrating encysted noninvasive papillary carcinoma in situ well differentiated, 6 mm.  Foci of DCIS intermediate nuclear grade, cribiform and micropapillary type.\par No RT - Dr. Nagel; No Tamoxifen, No med oncology, deferred by patient.  \par \par History of right breast solid papillary carcinoma (+/+/-) s/p WLE on 02/12/16 demonstrating 1.25 mm mod differentiated invasive solid papillary carcinoma, non extensive DCIS, low grade; with negative margins.  No LVI or perineural invasion, papilloma, ALH. \par Status post GUME Insertion 3/10/16; completed PBI on 3/18/16.  \par Was on AI by Dr. Hong; discontinued due to side effects.  \par \par Her family history is significant for her sister with breast cancer at 74.  \par \par \par Most recent imaging is as follows:\par 2/22/22: B/L Screening Mammo --> BIRADS 2\par -There are scattered areas of fibroglandular density.\par -There is an area of architectural distortion at the site of lumpectomy seen in the right breast.\par -There is no mammographic evidence of malignancy.\par \par \par 2/24/23: B scg mmg --> BIRADS 0\par Scattered areas of fibroglandular density\par Stable focal asymmetry in the right breast is benign finding\par Mass in the superior right breast requires additional evaluation\par \par 3/7/23: R mmg and US --> BIRADS 4\par Dense\par There is a round hyperdense mass measuring 0.8 cm in its greatest dimension is noted in superior aspect of the right breast at area of mammographic concern. This mass correlates with the sonographic finding below.\par At 12:00 N 10 there is a round hypoechoic mass measuring 1.1 x 0.8 x 0.6 cm correlating with mammographic finding above --> REC BX\par \par 3/14/23: USGB, R\par Recurrent intermediate nuclear grade solid papillary carcinoma, focally infarcted and with mucin production.\par (receptors pending)\par (minicork) (malignant concordant)\par \par 4/21/23: R WLE\par Invasive moderately differentiated ductal carcinoma, 1.2 mm, arising adjacent to a nodular focus of recurrent invasive solid papillary carcinoma, 7.0 mm.\par No lymphovascular or perineural invasion is seen.\par Invasive solid papillary carcinoma extending to focally involve (touches ink) the new surgical lateral margin.\par pT1a, pNx, pMx\par \par 5/10/23: R lateral margin re-excision\par Benign atrophic fatty breast tissue with healing postsurgical site\par \par She was recently hospitalized with atrial flutter and converted to sinus rhythm with medication. No current complaints with her breasts. Has sciatic left sided pain.

## 2023-06-01 NOTE — CHART NOTE - NSCHARTNOTEFT_GEN_A_CORE
Response to CDI inquiry  Patient treated for acute decompensated HFpEF due to atrial flutter during the admission.

## 2023-06-14 ENCOUNTER — OUTPATIENT (OUTPATIENT)
Dept: OUTPATIENT SERVICES | Facility: HOSPITAL | Age: 88
LOS: 1 days | End: 2023-06-14
Payer: MEDICARE

## 2023-06-14 ENCOUNTER — APPOINTMENT (OUTPATIENT)
Dept: HEMATOLOGY ONCOLOGY | Facility: CLINIC | Age: 88
End: 2023-06-14
Payer: MEDICARE

## 2023-06-14 VITALS
HEART RATE: 77 BPM | WEIGHT: 115 LBS | BODY MASS INDEX: 25.87 KG/M2 | DIASTOLIC BLOOD PRESSURE: 87 MMHG | SYSTOLIC BLOOD PRESSURE: 147 MMHG | TEMPERATURE: 98.6 F | HEIGHT: 56 IN | OXYGEN SATURATION: 99 % | RESPIRATION RATE: 18 BRPM

## 2023-06-14 DIAGNOSIS — Z79.811 LONG TERM (CURRENT) USE OF AROMATASE INHIBITORS: ICD-10-CM

## 2023-06-14 DIAGNOSIS — Z98.890 OTHER SPECIFIED POSTPROCEDURAL STATES: Chronic | ICD-10-CM

## 2023-06-14 DIAGNOSIS — C50.919 MALIGNANT NEOPLASM OF UNSPECIFIED SITE OF UNSPECIFIED FEMALE BREAST: ICD-10-CM

## 2023-06-14 PROCEDURE — 99214 OFFICE O/P EST MOD 30 MIN: CPT

## 2023-06-14 RX ORDER — EXEMESTANE 25 MG/1
25 TABLET, FILM COATED ORAL
Qty: 90 | Refills: 2 | Status: ACTIVE | COMMUNITY
Start: 2023-06-14 | End: 1900-01-01

## 2023-06-14 NOTE — PHYSICAL EXAM
[Restricted in physically strenuous activity but ambulatory and able to carry out work of a light or sedentary nature] : Status 1- Restricted in physically strenuous activity but ambulatory and able to carry out work of a light or sedentary nature, e.g., light house work, office work [Normal] : affect appropriate [de-identified] : The right breast is s/p lumpectomy. The new surgical incision in the RUQ healing well.  There is no palpable abnormality.  The left breast and left axilla are normal. Large scar issue above surgical scar. [de-identified] : Status post right ankle fracture.

## 2023-06-14 NOTE — ASSESSMENT
[FreeTextEntry1] : 1. Right breast IDC, G2, ER/ND pos, Her-2 neg, s/p right breast lumpectomy, stage [id number].  \par 2. 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 \par 3. Osteopenia/Osteoporosis.\par \par Assessment and Plan:\par -- Reviewed the pathology finding with the patient. The pathology showed 2 mm invasive moderately differentiated arising adjacent to a nodular focus of recurrent invasive solid papillary carcinoma, 7.0 mm associated with focal mucin production. The right lateral margin, excision showed Invasive solid papillary carcinoma extending to focally involve the new surgical margin. Re-excision of right lateral margin on 5/10/23 was negative for malignancy. The invasive tumor is ER pos 100%, ND pos 95%, Her-2 neg (0% by IHC), Ki67 5%. SLNB was not done.\par -- We discussed adjuvant endocrine therapy with an aromatase inhibitor. Previously, she was not able to tolerate Anastrozole due to fibromyalgia. She is recommended to try exemestane 25 mg daily. AI related side effects are similar but people may tolerate one AI better than other. She is will to give a trial. A script was sent to her pharmacy. \par -- Osteopenia/osteoporosis Continue Prolia injection every 6  months. Continue calcium and Vit D supplements. Followup with endocrinologist at [facility] .\par -- She will come back for followup visit in 11/2023. Right breast dx mammo and US in 11/2023. \par \par

## 2023-06-15 DIAGNOSIS — M81.0 AGE-RELATED OSTEOPOROSIS WITHOUT CURRENT PATHOLOGICAL FRACTURE: ICD-10-CM

## 2023-06-15 DIAGNOSIS — C50.919 MALIGNANT NEOPLASM OF UNSPECIFIED SITE OF UNSPECIFIED FEMALE BREAST: ICD-10-CM

## 2023-06-15 DIAGNOSIS — Z79.811 LONG TERM (CURRENT) USE OF AROMATASE INHIBITORS: ICD-10-CM

## 2023-06-15 DIAGNOSIS — C50.411 MALIGNANT NEOPLASM OF UPPER-OUTER QUADRANT OF RIGHT FEMALE BREAST: ICD-10-CM

## 2023-08-03 ENCOUNTER — APPOINTMENT (OUTPATIENT)
Dept: SURGERY | Facility: CLINIC | Age: 88
End: 2023-08-03
Payer: MEDICARE

## 2023-08-03 VITALS — WEIGHT: 108 LBS | HEIGHT: 56 IN | BODY MASS INDEX: 24.3 KG/M2

## 2023-08-03 PROCEDURE — 99213 OFFICE O/P EST LOW 20 MIN: CPT | Mod: 24

## 2023-08-03 NOTE — CONSULT LETTER
[Dear  ___] : Dear  [unfilled], [Courtesy Letter:] : I had the pleasure of seeing your patient, [unfilled], in my office today. [Please see my note below.] : Please see my note below. [Consult Closing:] : Thank you very much for allowing me to participate in the care of this patient.  If you have any questions, please do not hesitate to contact me. [FreeTextEntry3] : Respectfully,  Raji Juarez M.D., FACS

## 2023-08-03 NOTE — PHYSICAL EXAM
[Normal Breath Sounds] : Normal breath sounds [No Rash or Lesion] : No rash or lesion [Alert] : alert [Calm] : calm [JVD] : no jugular venous distention  [de-identified] : Healthy [de-identified] : Normal [de-identified] : Soft and flat abdomen [de-identified] : Large left inguinal hernia, reducible; incarcerated right inguinal hernia

## 2023-08-03 NOTE — ASSESSMENT
[FreeTextEntry1] : Adenike is a pleasant 88-year-old retired woman presenting to the office with her son (who recently underwent bilateral inguinal hernia surgery with me) with a past medical history significant for hypertension, hypercholesterolemia, GERD, anxiety and breast cancer status post lumpectomy earlier this year by Dr. Mckeon now with pain and swelling in both groins suspicious for hernias.  She has a habit of sneezing violently 6 or 7 times in a row every day, according to her son, which may have contributed to her current issue.  Physical examination demonstrates a large tender plum sized bulge in the left groin which is reducible with minimal to moderate difficulty cyst with a large symptomatic protruding left inguinal hernia warranting surgical repair.  There is no evidence of incarceration or strangulation, and the patient denies any symptoms of obstruction.  Examination of her right groin demonstrates a strawberry size tender irreducible bulge consistent with an incarcerated right inguinal hernia which deserves concomitant repair.  Her current BMI is 24.  I explained the pros and cons of surgery, as well as all risks, benefits, indications and alternatives of the procedure and the patient understood and agreed.  She would like this done as soon as possible in light of progressively worsening symptoms.  Adenike was scheduled for the repair of her left inguinal hernia with mesh and the repair of her incarcerated umbilical hernia with mesh on Thursday, September 7, 2023 under LOCAL with IV SEDATION at [facility] with presurgical testing waived.  She was encouraged to avoid heavy lifting and strenuous activity in the interim, of course.

## 2023-09-06 NOTE — ASU PATIENT PROFILE, ADULT - TEACHING/LEARNING FACTORS INFLUENCE READINESS TO LEARN
patient c/o light vaginal bleeding that started today. patient reports she is 10 weeks pregnant, due date 12/16/2018. denies abdominal pain or cramping. none

## 2023-09-07 ENCOUNTER — OUTPATIENT (OUTPATIENT)
Dept: OUTPATIENT SERVICES | Facility: HOSPITAL | Age: 88
LOS: 1 days | Discharge: HOME | End: 2023-09-07
Payer: MEDICARE

## 2023-09-07 ENCOUNTER — TRANSCRIPTION ENCOUNTER (OUTPATIENT)
Age: 88
End: 2023-09-07

## 2023-09-07 ENCOUNTER — APPOINTMENT (OUTPATIENT)
Dept: SURGERY | Facility: AMBULATORY SURGERY CENTER | Age: 88
End: 2023-09-07
Payer: MEDICARE

## 2023-09-07 VITALS
DIASTOLIC BLOOD PRESSURE: 60 MMHG | HEART RATE: 66 BPM | RESPIRATION RATE: 18 BRPM | OXYGEN SATURATION: 96 % | SYSTOLIC BLOOD PRESSURE: 130 MMHG

## 2023-09-07 VITALS
HEART RATE: 66 BPM | OXYGEN SATURATION: 97 % | WEIGHT: 110.01 LBS | TEMPERATURE: 97 F | HEIGHT: 59 IN | RESPIRATION RATE: 18 BRPM | SYSTOLIC BLOOD PRESSURE: 161 MMHG | DIASTOLIC BLOOD PRESSURE: 74 MMHG

## 2023-09-07 DIAGNOSIS — Z98.890 OTHER SPECIFIED POSTPROCEDURAL STATES: Chronic | ICD-10-CM

## 2023-09-07 DIAGNOSIS — K40.30 UNILATERAL INGUINAL HERNIA, WITH OBSTRUCTION, WITHOUT GANGRENE, NOT SPECIFIED AS RECURRENT: ICD-10-CM

## 2023-09-07 DIAGNOSIS — K40.90 UNILATERAL INGUINAL HERNIA, WITHOUT OBSTRUCTION OR GANGRENE, NOT SPECIFIED AS RECURRENT: ICD-10-CM

## 2023-09-07 PROCEDURE — 49505 PRP I/HERN INIT REDUC >5 YR: CPT | Mod: LT,XS

## 2023-09-07 PROCEDURE — 49507 PRP I/HERN INIT BLOCK >5 YR: CPT | Mod: RT

## 2023-09-07 PROCEDURE — C1781: CPT

## 2023-09-07 RX ORDER — ONDANSETRON 8 MG/1
4 TABLET, FILM COATED ORAL ONCE
Refills: 0 | Status: DISCONTINUED | OUTPATIENT
Start: 2023-09-07 | End: 2023-09-07

## 2023-09-07 RX ORDER — ROSUVASTATIN CALCIUM 5 MG/1
1 TABLET ORAL
Qty: 0 | Refills: 0 | DISCHARGE

## 2023-09-07 RX ORDER — SODIUM CHLORIDE 9 MG/ML
500 INJECTION, SOLUTION INTRAVENOUS
Refills: 0 | Status: DISCONTINUED | OUTPATIENT
Start: 2023-09-07 | End: 2023-09-07

## 2023-09-07 RX ORDER — ACETAMINOPHEN 500 MG
650 TABLET ORAL ONCE
Refills: 0 | Status: COMPLETED | OUTPATIENT
Start: 2023-09-07 | End: 2023-09-07

## 2023-09-07 RX ORDER — METOPROLOL TARTRATE 50 MG
1 TABLET ORAL
Qty: 0 | Refills: 0 | DISCHARGE

## 2023-09-07 RX ORDER — MORPHINE SULFATE 50 MG/1
2 CAPSULE, EXTENDED RELEASE ORAL
Refills: 0 | Status: DISCONTINUED | OUTPATIENT
Start: 2023-09-07 | End: 2023-09-07

## 2023-09-07 RX ADMIN — Medication 650 MILLIGRAM(S): at 11:39

## 2023-09-07 RX ADMIN — SODIUM CHLORIDE 100 MILLILITER(S): 9 INJECTION, SOLUTION INTRAVENOUS at 11:06

## 2023-09-07 NOTE — CHART NOTE - NSCHARTNOTEFT_GEN_A_CORE
PACU ANESTHESIA ADMISSION NOTE      Procedure: Open repair of incarcerated inguinal hernia using mesh in adult  RIGHT    Repair of left inguinal hernia with mesh      Post op diagnosis:  Inguinal hernia, left    Incarcerated right inguinal hernia        ____  Intubated  TV:______       Rate: ______      FiO2: ______    _x___  Patent Airway    _x___  Full return of protective reflexes    __x__  Full recovery from anesthesia / back to baseline     Vitals:   T:  37         R:  18                BP:  90/55                Sat:  97                 P: 73      Mental Status:  ____ Awake   _____ Alert   ___x__ Drowsy   _____ Sedated    Nausea/Vomiting:  _x___ NO  ______Yes,   See Post - Op Orders          Pain Scale (0-10):  __0___    Treatment: ____ None    ____ See Post - Op/PCA Orders    Post - Operative Fluids:   __x__ Oral   ____ See Post - Op Orders    Plan: Discharge:   x____Home       _____Floor     _____Critical Care    _____  Other:_________________    Comments:

## 2023-09-07 NOTE — BRIEF OPERATIVE NOTE - NSICDXBRIEFPOSTOP_GEN_ALL_CORE_FT
POST-OP DIAGNOSIS:  Inguinal hernia, left 07-Sep-2023 08:53:22  Raji Juarez  Incarcerated right inguinal hernia 07-Sep-2023 08:53:23  Raji Juarez

## 2023-09-07 NOTE — ASU DISCHARGE PLAN (ADULT/PEDIATRIC) - NS MD DC FALL RISK RISK
For information on Fall & Injury Prevention, visit: [email or web address] OR  [email or web address] OR  https://www.cdc.gov/steadi/patient.html

## 2023-09-07 NOTE — ASU DISCHARGE PLAN (ADULT/PEDIATRIC) - COMMENTS
- You will see [facility] Physician Assistant, Laura Gary, at your postoperative visit noted above.

## 2023-09-07 NOTE — PRE-ANESTHESIA EVALUATION ADULT - NSANTHTOTALSCORECAL_ENT_A_CORE
Kimberlyn Marcial's SCREENING SCHEDULE   Tests on this list are recommended by your physician but may not be covered, or covered at this frequency, by your insurer. Please check with your insurance carrier before scheduling to verify coverage.    PREVENTATIVE S Screening  Covered up to Age 76     Colonoscopy Screen   Covered every 10 years- more often if abnormal Colonoscopy,10 Years due on 01/18/2027  Colonoscopy,10 Years due on 01/18/2027 Update Health Maintenance if applicable    Flex Sigmoidoscopy Screen  Cov (Prevnar)  Covered Once after 65   Orders placed or performed in visit on 07/01/16  -PNEUMOCOCCAL VACC, 13 PATIENCE IM    Please get once after your 65th birthday    Pneumococcal 23 (Pneumovax)  Covered Once after 65 No orders found for this or any previous vis 2

## 2023-09-07 NOTE — ASU PREOP CHECKLIST - BLOOD AVAILABLE
Calorie Count  Intake recorded for: 12/12  Total Kcals: 473 Total Protein: 25g  Kcals from Hospital Food: 473  Protein: 25g  Kcals from Outside Food (average):0 Protein: 0g  # Meals Ordered from Kitchen: 1 meal   # Meals Recorded: 1 meal (First - 100% grapes, 75% omelet w/ ham, peppers, onions & cheese)  # Supplements Recorded: 100% 1 Ensure Clear      n/a

## 2023-09-07 NOTE — BRIEF OPERATIVE NOTE - NSICDXBRIEFPROCEDURE_GEN_ALL_CORE_FT
PROCEDURES:  Open repair of incarcerated inguinal hernia using mesh in adult 07-Sep-2023 08:53:29 RIGHT Raji Juarez  Repair of left inguinal hernia with mesh 07-Sep-2023 08:53:30  Raji Juarez

## 2023-09-07 NOTE — ASU DISCHARGE PLAN (ADULT/PEDIATRIC) - ASU DC SPECIAL INSTRUCTIONSFT
Diet    Eat light on the day of surgery. Nausea and vomiting can occur after anesthesia,   but usually resolve within 24 hours.  Resume normal diet the following day.      Activity    Rest!  No heavy lifting or strenuous activity.    Medications    Ibuprofen (Advil, Motrin), Naprosyn (Aleve) and/or Extra-Strength Tylenol for pain.  Tramadol for severe pain only.  Your prescription was sent electronically to your pharmacy.  Remember, Tramadol is a strong narcotic-like pain reliever which can cause drowsiness, upset stomach and constipation.  It should always be taken with food.  You can use stool softener (Mineral Oil) or laxative (MiraLax or Dulcolax) if constipated.  An antibiotic is given during surgery.  No antibiotic needed at home.  Resume all previous medications.  Resume blood thinners the day after surgery unless told otherwise.    Wound Care    Leave surgical dressing in place.  May shower (dressing is waterproof.)  No pool, ocean, lake, hot-tub or   bath for 3 weeks. If you were given an abdominal binder, please wear it as much as possible (day & night) for 2 weeks, but you must remove it for showers.  Ice packs to the area intermittently for several days help with pain and swelling.  Bruising (“black and blue”) is common.  Treatment is…Ice & Rest.       - You will see [facility] Physician Assistant, Laura Gary, at your postoperative visit noted below.

## 2023-09-07 NOTE — ASU DISCHARGE PLAN (ADULT/PEDIATRIC) - CARE PROVIDER_API CALL
Raji Juarez  Surgery  [address] [place] [id number]  Phone: [phone]  Fax: [phone]  Scheduled Appointment: 09/18/2023 01:20 PM

## 2023-09-07 NOTE — BRIEF OPERATIVE NOTE - NSICDXBRIEFPREOP_GEN_ALL_CORE_FT
PRE-OP DIAGNOSIS:  Inguinal hernia, left 07-Sep-2023 08:53:12  Raji Juarez  Incarcerated right inguinal hernia 07-Sep-2023 08:53:19  Raji Juarez

## 2023-09-11 DIAGNOSIS — Z79.01 LONG TERM (CURRENT) USE OF ANTICOAGULANTS: ICD-10-CM

## 2023-09-11 DIAGNOSIS — K40.30 UNILATERAL INGUINAL HERNIA, WITH OBSTRUCTION, WITHOUT GANGRENE, NOT SPECIFIED AS RECURRENT: ICD-10-CM

## 2023-09-11 DIAGNOSIS — E78.00 PURE HYPERCHOLESTEROLEMIA, UNSPECIFIED: ICD-10-CM

## 2023-09-11 DIAGNOSIS — Z85.3 PERSONAL HISTORY OF MALIGNANT NEOPLASM OF BREAST: ICD-10-CM

## 2023-09-11 DIAGNOSIS — K40.90 UNILATERAL INGUINAL HERNIA, WITHOUT OBSTRUCTION OR GANGRENE, NOT SPECIFIED AS RECURRENT: ICD-10-CM

## 2023-09-11 DIAGNOSIS — M81.0 AGE-RELATED OSTEOPOROSIS WITHOUT CURRENT PATHOLOGICAL FRACTURE: ICD-10-CM

## 2023-09-11 DIAGNOSIS — I10 ESSENTIAL (PRIMARY) HYPERTENSION: ICD-10-CM

## 2023-09-11 DIAGNOSIS — K21.9 GASTRO-ESOPHAGEAL REFLUX DISEASE WITHOUT ESOPHAGITIS: ICD-10-CM

## 2023-09-11 DIAGNOSIS — F41.9 ANXIETY DISORDER, UNSPECIFIED: ICD-10-CM

## 2023-09-18 ENCOUNTER — APPOINTMENT (OUTPATIENT)
Dept: SURGERY | Facility: CLINIC | Age: 88
End: 2023-09-18
Payer: MEDICARE

## 2023-09-18 ENCOUNTER — NON-APPOINTMENT (OUTPATIENT)
Age: 88
End: 2023-09-18

## 2023-09-18 DIAGNOSIS — K40.90 UNILATERAL INGUINAL HERNIA, W/OUT OBSTRUCTION OR GANGRENE, NOT SPECIFIED AS RECURRENT: ICD-10-CM

## 2023-09-18 DIAGNOSIS — K40.30 UNILATERAL INGUINAL HERNIA, WITH OBSTRUCTION, W/OUT GANGRENE, NOT SPECIFIED AS RECURRENT: ICD-10-CM

## 2023-09-18 PROCEDURE — 99024 POSTOP FOLLOW-UP VISIT: CPT

## 2023-09-18 RX ORDER — TRAMADOL HYDROCHLORIDE 50 MG/1
50 TABLET, COATED ORAL
Qty: 20 | Refills: 0 | Status: COMPLETED | COMMUNITY
Start: 2023-09-07 | End: 2023-09-18

## 2023-09-20 ENCOUNTER — APPOINTMENT (OUTPATIENT)
Dept: OBGYN | Facility: CLINIC | Age: 88
End: 2023-09-20
Payer: MEDICARE

## 2023-09-20 VITALS
TEMPERATURE: 98.6 F | BODY MASS INDEX: 24.75 KG/M2 | WEIGHT: 110 LBS | DIASTOLIC BLOOD PRESSURE: 90 MMHG | SYSTOLIC BLOOD PRESSURE: 140 MMHG | HEART RATE: 87 BPM | HEIGHT: 56 IN

## 2023-09-20 LAB
BILIRUB UR QL STRIP: NORMAL
CLARITY UR: CLEAR
COLLECTION METHOD: NORMAL
GLUCOSE UR-MCNC: NORMAL
HCG UR QL: 0.2 EU/DL
HGB UR QL STRIP.AUTO: ABNORMAL
KETONES UR-MCNC: NORMAL
LEUKOCYTE ESTERASE UR QL STRIP: ABNORMAL
NITRITE UR QL STRIP: NORMAL
PH UR STRIP: 7.5
PROT UR STRIP-MCNC: NORMAL
SP GR UR STRIP: 1.01

## 2023-09-20 PROCEDURE — 81003 URINALYSIS AUTO W/O SCOPE: CPT | Mod: QW

## 2023-09-20 PROCEDURE — 99213 OFFICE O/P EST LOW 20 MIN: CPT

## 2023-10-04 RX ORDER — BACITRACIN 500 [IU]/G
500 OINTMENT TOPICAL TWICE DAILY
Qty: 1 | Refills: 0 | Status: ACTIVE | COMMUNITY
Start: 2023-10-04 | End: 1900-01-01

## 2023-10-24 ENCOUNTER — APPOINTMENT (OUTPATIENT)
Dept: OBGYN | Facility: CLINIC | Age: 88
End: 2023-10-24

## 2023-11-15 ENCOUNTER — RESULT REVIEW (OUTPATIENT)
Age: 88
End: 2023-11-15

## 2023-11-15 ENCOUNTER — OUTPATIENT (OUTPATIENT)
Dept: OUTPATIENT SERVICES | Facility: HOSPITAL | Age: 88
LOS: 1 days | End: 2023-11-15
Payer: MEDICARE

## 2023-11-15 DIAGNOSIS — Z98.890 OTHER SPECIFIED POSTPROCEDURAL STATES: Chronic | ICD-10-CM

## 2023-11-15 DIAGNOSIS — R92.8 OTHER ABNORMAL AND INCONCLUSIVE FINDINGS ON DIAGNOSTIC IMAGING OF BREAST: ICD-10-CM

## 2023-11-15 PROCEDURE — 77065 DX MAMMO INCL CAD UNI: CPT | Mod: RT

## 2023-11-15 PROCEDURE — 76642 ULTRASOUND BREAST LIMITED: CPT | Mod: 26,RT

## 2023-11-15 PROCEDURE — G0279: CPT

## 2023-11-15 PROCEDURE — G0279: CPT | Mod: 26

## 2023-11-15 PROCEDURE — 77065 DX MAMMO INCL CAD UNI: CPT | Mod: 26,RT

## 2023-11-15 PROCEDURE — 76642 ULTRASOUND BREAST LIMITED: CPT | Mod: RT

## 2023-11-16 DIAGNOSIS — R92.8 OTHER ABNORMAL AND INCONCLUSIVE FINDINGS ON DIAGNOSTIC IMAGING OF BREAST: ICD-10-CM

## 2023-11-21 ENCOUNTER — APPOINTMENT (OUTPATIENT)
Dept: BREAST CENTER | Facility: CLINIC | Age: 88
End: 2023-11-21
Payer: MEDICARE

## 2023-11-21 DIAGNOSIS — Z98.890 OTHER SPECIFIED POSTPROCEDURAL STATES: ICD-10-CM

## 2023-11-21 PROCEDURE — 99214 OFFICE O/P EST MOD 30 MIN: CPT | Mod: 24

## 2023-11-27 ENCOUNTER — OUTPATIENT (OUTPATIENT)
Dept: OUTPATIENT SERVICES | Facility: HOSPITAL | Age: 88
LOS: 1 days | End: 2023-11-27
Payer: MEDICARE

## 2023-11-27 ENCOUNTER — APPOINTMENT (OUTPATIENT)
Dept: HEMATOLOGY ONCOLOGY | Facility: CLINIC | Age: 88
End: 2023-11-27
Payer: MEDICARE

## 2023-11-27 VITALS
RESPIRATION RATE: 16 BRPM | WEIGHT: 112 LBS | TEMPERATURE: 97.3 F | HEART RATE: 73 BPM | HEIGHT: 56 IN | DIASTOLIC BLOOD PRESSURE: 77 MMHG | BODY MASS INDEX: 25.19 KG/M2 | SYSTOLIC BLOOD PRESSURE: 179 MMHG

## 2023-11-27 DIAGNOSIS — Z98.890 OTHER SPECIFIED POSTPROCEDURAL STATES: Chronic | ICD-10-CM

## 2023-11-27 DIAGNOSIS — M81.0 AGE-RELATED OSTEOPOROSIS W/OUT CURRENT PATHOLOGICAL FRACTURE: ICD-10-CM

## 2023-11-27 DIAGNOSIS — C50.411 MALIGNANT NEOPLASM OF UPPER-OUTER QUADRANT OF RIGHT FEMALE BREAST: ICD-10-CM

## 2023-11-27 DIAGNOSIS — Z17.0 MALIGNANT NEOPLASM OF UPPER-OUTER QUADRANT OF RIGHT FEMALE BREAST: ICD-10-CM

## 2023-11-27 PROCEDURE — 99203 OFFICE O/P NEW LOW 30 MIN: CPT

## 2023-11-27 PROCEDURE — 99213 OFFICE O/P EST LOW 20 MIN: CPT

## 2023-11-28 DIAGNOSIS — C50.411 MALIGNANT NEOPLASM OF UPPER-OUTER QUADRANT OF RIGHT FEMALE BREAST: ICD-10-CM

## 2023-12-03 PROBLEM — C50.411 MALIGNANT NEOPLASM OF UPPER-OUTER QUADRANT OF RIGHT BREAST IN FEMALE, ESTROGEN RECEPTOR POSITIVE: Status: ACTIVE | Noted: 2017-07-20

## 2024-04-25 NOTE — ASU PATIENT PROFILE, ADULT - PATIENT KNOW
----- Message from Huey Mtz DNP, KIRK-C sent at 4/24/2024 11:33 AM CDT -----  Please notify of results  
yes

## 2024-05-14 ENCOUNTER — APPOINTMENT (OUTPATIENT)
Dept: BREAST CENTER | Facility: CLINIC | Age: 89
End: 2024-05-14

## 2024-05-29 ENCOUNTER — APPOINTMENT (OUTPATIENT)
Age: 89
End: 2024-05-29

## 2025-06-06 NOTE — ASU PREOP CHECKLIST - ALLERGY BAND ON
Copied from CRM #[id number]. Topic: MW Schedule Appointment  >> Jun 5, 2025 11:48 AM Kristal PRINCE wrote:  --DO NOT REPLY - Sent from PACT - If sent to wrong pool, reroute to [facility] Reroute pool --    Reason for Appointment Message: Pre-Op appointment - Unable to schedule within 30- days of surgery date   PreOp Appointment Scheduled?  No  Type of surgery: robotic assisted total hysterectomy  Location of surgery: [facility]  Date of surgery: 9/22/25  Surgeon Name: Dr. Susan Carlin  Other information: no preference for day or time of day  Callback #: [phone]  Can a detailed message be left? Yes - Voicemail   Caller has been advised this message will be addressed within:2-3 business days [routine]  
Patient is scheduled on 8/26/25 for pre-op appointment.   
done